# Patient Record
Sex: FEMALE | Race: WHITE | NOT HISPANIC OR LATINO | Employment: FULL TIME | URBAN - METROPOLITAN AREA
[De-identification: names, ages, dates, MRNs, and addresses within clinical notes are randomized per-mention and may not be internally consistent; named-entity substitution may affect disease eponyms.]

---

## 2017-05-05 ENCOUNTER — HOSPITAL ENCOUNTER (EMERGENCY)
Facility: HOSPITAL | Age: 42
Discharge: HOME | End: 2017-05-05
Attending: EMERGENCY MEDICINE | Admitting: EMERGENCY MEDICINE

## 2017-05-05 VITALS
HEART RATE: 89 BPM | DIASTOLIC BLOOD PRESSURE: 96 MMHG | SYSTOLIC BLOOD PRESSURE: 155 MMHG | RESPIRATION RATE: 16 BRPM | BODY MASS INDEX: 34.55 KG/M2 | HEIGHT: 60 IN | WEIGHT: 176 LBS | TEMPERATURE: 97.9 F | OXYGEN SATURATION: 99 %

## 2017-05-05 DIAGNOSIS — E10.65 HYPERGLYCEMIA DUE TO TYPE 1 DIABETES MELLITUS (HCC): ICD-10-CM

## 2017-05-05 DIAGNOSIS — E10.65 POORLY CONTROLLED TYPE 1 DIABETES MELLITUS (HCC): Primary | ICD-10-CM

## 2017-05-05 LAB
ALBUMIN SERPL-MCNC: 3.9 G/DL (ref 3.5–5.2)
ALBUMIN/GLOB SERPL: 1.2 G/DL
ALP SERPL-CCNC: 65 U/L (ref 40–129)
ALT SERPL W P-5'-P-CCNC: 38 U/L (ref 5–33)
ANION GAP SERPL CALCULATED.3IONS-SCNC: 15.8 MMOL/L
AST SERPL-CCNC: 25 U/L (ref 5–32)
BASOPHILS # BLD AUTO: 0.05 10*3/MM3 (ref 0–0.2)
BASOPHILS NFR BLD AUTO: 0.4 % (ref 0–2)
BILIRUB SERPL-MCNC: <0.2 MG/DL (ref 0.2–1.2)
BILIRUB UR QL STRIP: NEGATIVE
BUN BLD-MCNC: 14 MG/DL (ref 6–20)
BUN/CREAT SERPL: 30.4 (ref 7–25)
CALCIUM SPEC-SCNC: 8.9 MG/DL (ref 8.6–10.5)
CHLORIDE SERPL-SCNC: 99 MMOL/L (ref 98–107)
CLARITY UR: CLEAR
CO2 SERPL-SCNC: 22.2 MMOL/L (ref 22–29)
COLOR UR: YELLOW
CREAT BLD-MCNC: 0.46 MG/DL (ref 0.57–1)
DEPRECATED RDW RBC AUTO: 38.2 FL (ref 37–54)
EOSINOPHIL # BLD AUTO: 0.36 10*3/MM3 (ref 0.1–0.3)
EOSINOPHIL NFR BLD AUTO: 3.2 % (ref 0–4)
ERYTHROCYTE [DISTWIDTH] IN BLOOD BY AUTOMATED COUNT: 13.2 % (ref 11.5–14.5)
GFR SERPL CREATININE-BSD FRML MDRD: 148 ML/MIN/1.73
GLOBULIN UR ELPH-MCNC: 3.3 GM/DL
GLUCOSE BLD-MCNC: 272 MG/DL (ref 65–99)
GLUCOSE BLDC GLUCOMTR-MCNC: 300 MG/DL (ref 70–130)
GLUCOSE UR STRIP-MCNC: ABNORMAL MG/DL
HCG SERPL QL: NEGATIVE
HCT VFR BLD AUTO: 39 % (ref 37–47)
HGB BLD-MCNC: 12.9 G/DL (ref 12–16)
HGB UR QL STRIP.AUTO: NEGATIVE
IMM GRANULOCYTES # BLD: 0.04 10*3/MM3 (ref 0–0.03)
IMM GRANULOCYTES NFR BLD: 0.4 % (ref 0–0.5)
KETONES UR QL STRIP: ABNORMAL
LEUKOCYTE ESTERASE UR QL STRIP.AUTO: NEGATIVE
LYMPHOCYTES # BLD AUTO: 2.99 10*3/MM3 (ref 0.6–4.8)
LYMPHOCYTES NFR BLD AUTO: 26.4 % (ref 20–45)
MCH RBC QN AUTO: 26.8 PG (ref 27–31)
MCHC RBC AUTO-ENTMCNC: 33.1 G/DL (ref 31–37)
MCV RBC AUTO: 80.9 FL (ref 81–99)
MONOCYTES # BLD AUTO: 0.5 10*3/MM3 (ref 0–1)
MONOCYTES NFR BLD AUTO: 4.4 % (ref 3–8)
NEUTROPHILS # BLD AUTO: 7.4 10*3/MM3 (ref 1.5–8.3)
NEUTROPHILS NFR BLD AUTO: 65.2 % (ref 45–70)
NITRITE UR QL STRIP: NEGATIVE
NRBC BLD MANUAL-RTO: 0 /100 WBC (ref 0–0)
PH UR STRIP.AUTO: 5.5 [PH] (ref 4.5–8)
PLATELET # BLD AUTO: 289 10*3/MM3 (ref 140–500)
PMV BLD AUTO: 9.8 FL (ref 7.4–10.4)
POTASSIUM BLD-SCNC: 4.2 MMOL/L (ref 3.5–5.2)
PROT SERPL-MCNC: 7.2 G/DL (ref 6–8.5)
PROT UR QL STRIP: NEGATIVE
RBC # BLD AUTO: 4.82 10*6/MM3 (ref 4.2–5.4)
SODIUM BLD-SCNC: 137 MMOL/L (ref 136–145)
SP GR UR STRIP: 1.02 (ref 1–1.03)
UROBILINOGEN UR QL STRIP: ABNORMAL
WBC NRBC COR # BLD: 11.34 10*3/MM3 (ref 4.8–10.8)

## 2017-05-05 PROCEDURE — 82962 GLUCOSE BLOOD TEST: CPT

## 2017-05-05 PROCEDURE — 99284 EMERGENCY DEPT VISIT MOD MDM: CPT | Performed by: EMERGENCY MEDICINE

## 2017-05-05 PROCEDURE — 85025 COMPLETE CBC W/AUTO DIFF WBC: CPT | Performed by: EMERGENCY MEDICINE

## 2017-05-05 PROCEDURE — 81003 URINALYSIS AUTO W/O SCOPE: CPT | Performed by: EMERGENCY MEDICINE

## 2017-05-05 PROCEDURE — 99283 EMERGENCY DEPT VISIT LOW MDM: CPT

## 2017-05-05 PROCEDURE — 84703 CHORIONIC GONADOTROPIN ASSAY: CPT | Performed by: EMERGENCY MEDICINE

## 2017-05-05 PROCEDURE — 80053 COMPREHEN METABOLIC PANEL: CPT | Performed by: EMERGENCY MEDICINE

## 2017-05-05 PROCEDURE — 96360 HYDRATION IV INFUSION INIT: CPT

## 2017-05-05 RX ORDER — LISINOPRIL 10 MG/1
10 TABLET ORAL DAILY
COMMUNITY
End: 2018-03-20 | Stop reason: CLARIF

## 2017-05-05 RX ORDER — PANTOPRAZOLE SODIUM 40 MG/1
40 TABLET, DELAYED RELEASE ORAL DAILY
COMMUNITY

## 2017-05-05 RX ORDER — MELOXICAM 15 MG/1
15 TABLET ORAL DAILY
COMMUNITY
End: 2020-03-03

## 2017-05-05 RX ORDER — SODIUM CHLORIDE 0.9 % (FLUSH) 0.9 %
10 SYRINGE (ML) INJECTION AS NEEDED
Status: DISCONTINUED | OUTPATIENT
Start: 2017-05-05 | End: 2017-05-05 | Stop reason: HOSPADM

## 2017-05-05 RX ADMIN — SODIUM CHLORIDE 500 ML: 9 INJECTION, SOLUTION INTRAVENOUS at 03:52

## 2017-10-19 ENCOUNTER — HOSPITAL ENCOUNTER (OUTPATIENT)
Facility: HOSPITAL | Age: 42
Setting detail: OBSERVATION
Discharge: HOME | End: 2017-10-20
Attending: INTERNAL MEDICINE | Admitting: INTERNAL MEDICINE

## 2017-10-19 ENCOUNTER — APPOINTMENT (OUTPATIENT)
Dept: GENERAL RADIOLOGY | Facility: HOSPITAL | Age: 42
End: 2017-10-19

## 2017-10-19 DIAGNOSIS — I10 HYPERTENSION, UNSPECIFIED TYPE: ICD-10-CM

## 2017-10-19 DIAGNOSIS — R07.89 OTHER CHEST PAIN: Primary | ICD-10-CM

## 2017-10-19 LAB
ALBUMIN SERPL-MCNC: 3.9 G/DL (ref 3.5–5.2)
ALBUMIN/GLOB SERPL: 1.3 G/DL
ALP SERPL-CCNC: 60 U/L (ref 40–129)
ALT SERPL W P-5'-P-CCNC: 37 U/L (ref 5–33)
ANION GAP SERPL CALCULATED.3IONS-SCNC: 13.8 MMOL/L
APTT PPP: 28.7 SECONDS (ref 24.3–38.1)
AST SERPL-CCNC: 27 U/L (ref 5–32)
BASOPHILS # BLD AUTO: 0.04 10*3/MM3 (ref 0–0.2)
BASOPHILS NFR BLD AUTO: 0.4 % (ref 0–2)
BILIRUB SERPL-MCNC: <0.2 MG/DL (ref 0.2–1.2)
BUN BLD-MCNC: 10 MG/DL (ref 6–20)
BUN/CREAT SERPL: 25.6 (ref 7–25)
CALCIUM SPEC-SCNC: 8.6 MG/DL (ref 8.6–10.5)
CHLORIDE SERPL-SCNC: 99 MMOL/L (ref 98–107)
CO2 SERPL-SCNC: 23.2 MMOL/L (ref 22–29)
CREAT BLD-MCNC: 0.39 MG/DL (ref 0.57–1)
D DIMER PPP FEU-MCNC: 0.38 MCGFEU/ML (ref 0–0.46)
DEPRECATED RDW RBC AUTO: 39.5 FL (ref 37–54)
EOSINOPHIL # BLD AUTO: 0.21 10*3/MM3 (ref 0.1–0.3)
EOSINOPHIL NFR BLD AUTO: 2.2 % (ref 0–4)
ERYTHROCYTE [DISTWIDTH] IN BLOOD BY AUTOMATED COUNT: 13.8 % (ref 11.5–14.5)
GFR SERPL CREATININE-BSD FRML MDRD: >150 ML/MIN/1.73
GLOBULIN UR ELPH-MCNC: 2.9 GM/DL
GLUCOSE BLD-MCNC: 234 MG/DL (ref 65–99)
GLUCOSE BLDC GLUCOMTR-MCNC: 197 MG/DL (ref 70–130)
HCG SERPL QL: NEGATIVE
HCT VFR BLD AUTO: 33.4 % (ref 37–47)
HGB BLD-MCNC: 11.2 G/DL (ref 12–16)
IMM GRANULOCYTES # BLD: 0.06 10*3/MM3 (ref 0–0.03)
IMM GRANULOCYTES NFR BLD: 0.6 % (ref 0–0.5)
INR PPP: 0.97 (ref 0.9–1.1)
LYMPHOCYTES # BLD AUTO: 2.49 10*3/MM3 (ref 0.6–4.8)
LYMPHOCYTES NFR BLD AUTO: 25.9 % (ref 20–45)
MCH RBC QN AUTO: 26.7 PG (ref 27–31)
MCHC RBC AUTO-ENTMCNC: 33.5 G/DL (ref 31–37)
MCV RBC AUTO: 79.5 FL (ref 81–99)
MONOCYTES # BLD AUTO: 0.48 10*3/MM3 (ref 0–1)
MONOCYTES NFR BLD AUTO: 5 % (ref 3–8)
NEUTROPHILS # BLD AUTO: 6.35 10*3/MM3 (ref 1.5–8.3)
NEUTROPHILS NFR BLD AUTO: 65.9 % (ref 45–70)
NRBC BLD MANUAL-RTO: 0 /100 WBC (ref 0–0)
PLATELET # BLD AUTO: 264 10*3/MM3 (ref 140–500)
PMV BLD AUTO: 9.8 FL (ref 7.4–10.4)
POTASSIUM BLD-SCNC: 3.5 MMOL/L (ref 3.5–5.2)
PROT SERPL-MCNC: 6.8 G/DL (ref 6–8.5)
PROTHROMBIN TIME: 12.9 SECONDS (ref 12.1–15)
RBC # BLD AUTO: 4.2 10*6/MM3 (ref 4.2–5.4)
SODIUM BLD-SCNC: 136 MMOL/L (ref 136–145)
TROPONIN T SERPL-MCNC: <0.01 NG/ML (ref 0–0.03)
WBC NRBC COR # BLD: 9.63 10*3/MM3 (ref 4.8–10.8)

## 2017-10-19 PROCEDURE — G0378 HOSPITAL OBSERVATION PER HR: HCPCS

## 2017-10-19 PROCEDURE — 84484 ASSAY OF TROPONIN QUANT: CPT | Performed by: PHYSICIAN ASSISTANT

## 2017-10-19 PROCEDURE — 25010000002 ENOXAPARIN PER 10 MG: Performed by: INTERNAL MEDICINE

## 2017-10-19 PROCEDURE — 93005 ELECTROCARDIOGRAM TRACING: CPT | Performed by: PHYSICIAN ASSISTANT

## 2017-10-19 PROCEDURE — 85025 COMPLETE CBC W/AUTO DIFF WBC: CPT | Performed by: PHYSICIAN ASSISTANT

## 2017-10-19 PROCEDURE — 82962 GLUCOSE BLOOD TEST: CPT

## 2017-10-19 PROCEDURE — 99219 PR INITIAL OBSERVATION CARE/DAY 50 MINUTES: CPT | Performed by: INTERNAL MEDICINE

## 2017-10-19 PROCEDURE — 85379 FIBRIN DEGRADATION QUANT: CPT | Performed by: PHYSICIAN ASSISTANT

## 2017-10-19 PROCEDURE — 85610 PROTHROMBIN TIME: CPT | Performed by: PHYSICIAN ASSISTANT

## 2017-10-19 PROCEDURE — 63710000001 INSULIN DETEMIR PER 5 UNITS: Performed by: INTERNAL MEDICINE

## 2017-10-19 PROCEDURE — 96372 THER/PROPH/DIAG INJ SC/IM: CPT

## 2017-10-19 PROCEDURE — 80053 COMPREHEN METABOLIC PANEL: CPT | Performed by: PHYSICIAN ASSISTANT

## 2017-10-19 PROCEDURE — 71010 HC CHEST PA OR AP: CPT

## 2017-10-19 PROCEDURE — 94799 UNLISTED PULMONARY SVC/PX: CPT

## 2017-10-19 PROCEDURE — 99285 EMERGENCY DEPT VISIT HI MDM: CPT | Performed by: PHYSICIAN ASSISTANT

## 2017-10-19 PROCEDURE — 85730 THROMBOPLASTIN TIME PARTIAL: CPT | Performed by: PHYSICIAN ASSISTANT

## 2017-10-19 PROCEDURE — 84484 ASSAY OF TROPONIN QUANT: CPT | Performed by: INTERNAL MEDICINE

## 2017-10-19 PROCEDURE — 84703 CHORIONIC GONADOTROPIN ASSAY: CPT | Performed by: PHYSICIAN ASSISTANT

## 2017-10-19 PROCEDURE — 99285 EMERGENCY DEPT VISIT HI MDM: CPT

## 2017-10-19 PROCEDURE — 93010 ELECTROCARDIOGRAM REPORT: CPT | Performed by: INTERNAL MEDICINE

## 2017-10-19 RX ORDER — SODIUM CHLORIDE 9 MG/ML
40 INJECTION, SOLUTION INTRAVENOUS AS NEEDED
Status: DISCONTINUED | OUTPATIENT
Start: 2017-10-19 | End: 2017-10-20 | Stop reason: HOSPADM

## 2017-10-19 RX ORDER — GLIPIZIDE 5 MG/1
5 TABLET ORAL
Status: DISCONTINUED | OUTPATIENT
Start: 2017-10-19 | End: 2017-10-20 | Stop reason: HOSPADM

## 2017-10-19 RX ORDER — SODIUM CHLORIDE 0.9 % (FLUSH) 0.9 %
10 SYRINGE (ML) INJECTION AS NEEDED
Status: DISCONTINUED | OUTPATIENT
Start: 2017-10-19 | End: 2017-10-20 | Stop reason: HOSPADM

## 2017-10-19 RX ORDER — PANTOPRAZOLE SODIUM 40 MG/1
40 TABLET, DELAYED RELEASE ORAL DAILY
Status: DISCONTINUED | OUTPATIENT
Start: 2017-10-19 | End: 2017-10-20 | Stop reason: HOSPADM

## 2017-10-19 RX ORDER — LISINOPRIL 5 MG/1
TABLET ORAL
Status: COMPLETED
Start: 2017-10-19 | End: 2017-10-19

## 2017-10-19 RX ORDER — NITROGLYCERIN 0.4 MG/1
0.4 TABLET SUBLINGUAL
Status: DISCONTINUED | OUTPATIENT
Start: 2017-10-19 | End: 2017-10-20 | Stop reason: HOSPADM

## 2017-10-19 RX ORDER — ALUMINA, MAGNESIA, AND SIMETHICONE 2400; 2400; 240 MG/30ML; MG/30ML; MG/30ML
15 SUSPENSION ORAL ONCE
Status: COMPLETED | OUTPATIENT
Start: 2017-10-19 | End: 2017-10-19

## 2017-10-19 RX ORDER — ASPIRIN 81 MG/1
324 TABLET, CHEWABLE ORAL ONCE
Status: COMPLETED | OUTPATIENT
Start: 2017-10-19 | End: 2017-10-19

## 2017-10-19 RX ORDER — GLIPIZIDE 5 MG/1
10 TABLET ORAL
COMMUNITY
End: 2020-03-03

## 2017-10-19 RX ORDER — MELOXICAM 7.5 MG/1
15 TABLET ORAL DAILY
Status: DISCONTINUED | OUTPATIENT
Start: 2017-10-20 | End: 2017-10-20 | Stop reason: HOSPADM

## 2017-10-19 RX ORDER — ACETAMINOPHEN 325 MG/1
650 TABLET ORAL EVERY 6 HOURS PRN
Status: DISCONTINUED | OUTPATIENT
Start: 2017-10-19 | End: 2017-10-20 | Stop reason: HOSPADM

## 2017-10-19 RX ORDER — SODIUM CHLORIDE 0.9 % (FLUSH) 0.9 %
1-10 SYRINGE (ML) INJECTION AS NEEDED
Status: DISCONTINUED | OUTPATIENT
Start: 2017-10-19 | End: 2017-10-20 | Stop reason: HOSPADM

## 2017-10-19 RX ORDER — LISINOPRIL 10 MG/1
10 TABLET ORAL DAILY
Status: DISCONTINUED | OUTPATIENT
Start: 2017-10-20 | End: 2017-10-20

## 2017-10-19 RX ADMIN — ENOXAPARIN SODIUM 40 MG: 40 INJECTION SUBCUTANEOUS at 18:48

## 2017-10-19 RX ADMIN — LISINOPRIL 10 MG: 5 TABLET ORAL at 21:51

## 2017-10-19 RX ADMIN — LIDOCAINE HYDROCHLORIDE 15 ML: 20 SOLUTION ORAL; TOPICAL at 15:53

## 2017-10-19 RX ADMIN — METFORMIN HYDROCHLORIDE 1000 MG: 500 TABLET ORAL at 18:49

## 2017-10-19 RX ADMIN — ALUMINUM HYDROXIDE, MAGNESIUM HYDROXIDE, AND DIMETHICONE 15 ML: 400; 400; 40 SUSPENSION ORAL at 15:53

## 2017-10-19 RX ADMIN — LISINOPRIL 10 MG: 10 TABLET ORAL at 21:51

## 2017-10-19 RX ADMIN — PANTOPRAZOLE SODIUM 40 MG: 40 TABLET, DELAYED RELEASE ORAL at 18:49

## 2017-10-19 RX ADMIN — NITROGLYCERIN 0.4 MG: 0.4 TABLET SUBLINGUAL at 15:54

## 2017-10-19 RX ADMIN — INSULIN DETEMIR 60 UNITS: 100 INJECTION, SOLUTION SUBCUTANEOUS at 21:53

## 2017-10-19 RX ADMIN — GLIPIZIDE 5 MG: 5 TABLET ORAL at 18:49

## 2017-10-19 RX ADMIN — NITROGLYCERIN 0.4 MG: 0.4 TABLET SUBLINGUAL at 16:02

## 2017-10-19 RX ADMIN — ASPIRIN 81 MG 324 MG: 81 TABLET ORAL at 15:54

## 2017-10-19 NOTE — H&P
"       HOSPITALIST SERVICES     @ [facility], [place]                [facility] Hospitalist Team    ADMISSION HISTORY AND PHYSICAL    PATIENT:      Patient Care Team:  ARTEMIO Black as PCP - General (Family Medicine)    PATIENT IS A RESIDENT OF:  Lives at home      PATIENT ACCOMPANIED BY:        CHIEF COMPLAINT:     Elevated BP and chest pain for 2 days    HISTORY OF PRESENT ILLNESS:    Ms. Jenni Lopez is a 42 year old  female who is known to have hx of Type II DM, GERD and a former smoker presented to Urgebnt Care with hx of elevated BP and chest pain for 2 days. The chest pain started at rest and is associated with some shortness of breath, lightheadedness. The pain is like \"a bear hugging her.\" Patient presented to Urgent Care and she was advised to come to [facility]. The chest pain was relieved with 2 S/L NTG. Nothing else made pain better or worse. The pain radiated to both arms. No hx of syncope, diaphoresis or palpitations. She does take lisinopril 10 mg for renal protection with diabetes, but has not had any previous hypertensive history. Patient denies any sx of fever, headache, abdominal pain, nausea/ vomiting, dysuria, urgency/ frequency or any recent hx of blood in stool. No other medical history available.      Thorough analysis of the patient’s complaint using the FAR COLD system:   Frequency started 2 days ago  Associated symptoms shortness of breath and lightheadedness  Radiations to both arms   Character \"like a bear hugging her\"   Onset 2 days ago   Location precordial area  Duration constant for 2 days  Exacerbating factors NONE  Relieving factors 2 S/L NTG      HEART Score  History: Moderately suspicious (+1)  ECG: Normal (+0)  Age: Greater than or equal to 65 (+0)  Risk Factors: 3 or more risk factors OR history of atherosclerotic disease (+2)  Troponin: Normal limit or lower (+0)  Total: 3        RISK FACTORS OF HEART DISEASE:  Risk " "factors are conditions or habits that make a person more likely to develop a disease. They can also increase the chances that an existing disease will get worse. Important risk factors for heart disease that you can do something about are:  High blood pressure   High blood cholesterol   Diabetes and prediabetes   Smoking (quit in Jan 2017)  Excessive Alcohol consumption  Illegal drug use.  Being overweight or obese   Being physically inactive   Having a family history of early heart disease   Having a history of preeclampsia during pregnancy   Unhealthy diet   Age (55 or older for women)   Race and Ethinicity: Heart disease has the leading cause of death in the [place] for non- whites, non- blacks, and American Indians. For Hispanics, and  Americans and Pacific Islanders, heart disease is second only to cancer as a cause of death.          Past Medical History:   Diagnosis Date   • Diabetes mellitus      History reviewed. No pertinent surgical history.  History reviewed. No pertinent family history.    Family History as documented in the Problem List.    Social History   Substance Use Topics   • Smoking status: Former Smoker     Types: Cigarettes     Quit date: 1/19/2017   • Smokeless tobacco: None   • Alcohol use None       (Not in a hospital admission)  Allergies:  Arithmin [antazoline]; Erythromycin; and Vicodin [hydrocodone-acetaminophen]    REVIEW OF SYSTEMS:  Please see the above history of present illness for pertinent positives and negatives.  The remainder of the patient's systems have been reviewed and are negative.     ROS negative except current and past medical and surgical history as noted in the Problem List.      Vital Signs  Temp:  [99.2 °F (37.3 °C)] 99.2 °F (37.3 °C)  Heart Rate:  [] 93  Resp:  [13-18] 13  BP: (120-188)/() 137/83    Flowsheet Rows         First Filed Value    Admission Height  61\" (154.9 cm) Documented at 10/19/2017 1320    Admission Weight  " 180 lb (81.6 kg) Documented at 10/19/2017 1320           Physical Exam:    PHYSICAL EXAMINATION:    VITAL SIGNS: As per Nurse's notes    GENERAL APPEARANCE: The patient is a well developed, well nourished, , in no acute distress without complaints of shortness of breath, dyspnea or acute pain. Lips and nail beds are pink.    HEENT: Normocephalic, atraumatic. PERRL. The sclerae anicteric and conjunctivae pink and moist. Lips, teeth, and gums showed normal mucosa. The oral mucosa, hard and soft palate, tongue and posterior pharynx were normal.     NECK: Supple and symmetric. No masses. No thyromegaly. No tenderness. Trachea central. No carotid bruits. No evidence of JVD.    LYMPH NODES: No palpable lymphadenopathy.    SKIN: Warm, dry and intact. No rash or lesions or wounds or patechiae.    CHEST: Normal AP diameter and normal contour without any kyphoscoliosis.    LUNGS: Clear to auscultation bilaterally. Respiratory expansion equal bilaterally. No wheezes/rales/crackles/rubs.    CARDIOVASCULAR: RRR. S1, S2 normal without murmur/gallop/rub. No S3, S4. The carotid pulses were normal and 2+ bilaterally without bruits. Peripheral pulses were 2+ and symmetric. Capillary refill less than 3 seconds.    ABDOMEN: Soft, non-tender, non-distended. No masses. No rebound/guarding. No hepatosplenomegaly. Normal bowel sounds.     RECTAL: Not indicated.     EXTREMITIES:  No evidence of cyanosis, clubbing, or edema. No rash or lesions. + pedal pulses. Moves all extremities well. Negative Homans sign bilaterally.     MUSCULOSKELETAL: Examination of cervical and lumbosacral spines unremarkable. ROM of extremities WNL. No evidence of redness, swelling, warmth or pain of the joints of the extremities. Muscle strength and tone normal.    PSYCHIATRY: Responds appropriately to questions. No evidence of acute anxiety, depression, panic attacks or hallucinations.    MENTAL STATUS EXAMINATION: Neatly dressed, conscious and alert.  Speech normal in tone. Pleasant and cooperative. Orientation x3. Thought process, content and insight are normal. Memory without deficits.    NEUROLOGIC: Examination is grossly intact globally with no focal deficits. Cranial nerves II through XII are grossly intact. No motor weakness. No decrease in sensation. No facial asymmetry.      Results Review:    I reviewed the patient's new clinical results.  Lab Results (most recent)     Procedure Component Value Units Date/Time    CBC & Differential [[id number]] Collected:  10/19/17 1430    Specimen:  Blood Updated:  10/19/17 1444    Narrative:       The following orders were created for panel order CBC & Differential.  Procedure                               Abnormality         Status                     ---------                               -----------         ------                     CBC Auto Differential[[id number]]        Abnormal            Final result                 Please view results for these tests on the individual orders.    CBC Auto Differential [[id number]]  (Abnormal) Collected:  10/19/17 1430    Specimen:  Blood Updated:  10/19/17 1444     WBC 9.63 10*3/mm3      RBC 4.20 10*6/mm3      Hemoglobin 11.2 (L) g/dL      Hematocrit 33.4 (L) %      MCV 79.5 (L) fL      MCH 26.7 (L) pg      MCHC 33.5 g/dL      RDW 13.8 %      RDW-SD 39.5 fl      MPV 9.8 fL      Platelets 264 10*3/mm3      Neutrophil % 65.9 %      Lymphocyte % 25.9 %      Monocyte % 5.0 %      Eosinophil % 2.2 %      Basophil % 0.4 %      Immature Grans % 0.6 (H) %      Neutrophils, Absolute 6.35 10*3/mm3      Lymphocytes, Absolute 2.49 10*3/mm3      Monocytes, Absolute 0.48 10*3/mm3      Eosinophils, Absolute 0.21 10*3/mm3      Basophils, Absolute 0.04 10*3/mm3      Immature Grans, Absolute 0.06 (H) 10*3/mm3      nRBC 0.0 /100 WBC     hCG, Serum, Qualitative [[id number]]  (Normal) Collected:  10/19/17 1430    Specimen:  Blood Updated:  10/19/17 1502     HCG Qualitative Negative    Troponin  [[id number]]  (Normal) Collected:  10/19/17 1430    Specimen:  Blood Updated:  10/19/17 1506     Troponin T <0.010 ng/mL     Narrative:       Troponin T Reference Ranges:  Less than 0.03 ng/mL:    Negative for AMI  0.03 to 0.09 ng/mL:      Indeterminant for AMI  Greater than 0.09 ng/mL: Positive for AMI    Comprehensive Metabolic Panel [[id number]]  (Abnormal) Collected:  10/19/17 1430    Specimen:  Blood Updated:  10/19/17 1518     Glucose 234 (H) mg/dL      BUN 10 mg/dL      Creatinine 0.39 (L) mg/dL      Sodium 136 mmol/L      Potassium 3.5 mmol/L      Chloride 99 mmol/L      CO2 23.2 mmol/L      Calcium 8.6 mg/dL      Total Protein 6.8 g/dL      Albumin 3.90 g/dL      ALT (SGPT) 37 (H) U/L      AST (SGOT) 27 U/L      Alkaline Phosphatase 60 U/L      Total Bilirubin <0.2 (L) mg/dL      eGFR Non African Amer >150 mL/min/1.73      Globulin 2.9 gm/dL      A/G Ratio 1.3 g/dL      BUN/Creatinine Ratio 25.6 (H)     Anion Gap 13.8 mmol/L     D-dimer, Quantitative [[id number]]  (Normal) Collected:  10/19/17 1430    Specimen:  Blood Updated:  10/19/17 1518     D-Dimer, Quantitative 0.38 MCGFEU/mL     Narrative:       Can be elevated in, but is not diagnostic for deep vein thrombosis (DVT) or pulmonary embolis (PE).  It is also elevated in other medical conditions.  Clinical correlation is required.  The negative cut-off value for the D-Dimer is 0.50 mcg FEU/mL for DVT and PE.    Protime-INR [[id number]]  (Normal) Collected:  10/19/17 1430    Specimen:  Blood Updated:  10/19/17 1518     Protime 12.9 Seconds      INR 0.97    Narrative:       Therapeutic Ranges for INR: 2.0-3.0 (PT 20-30)                              2.5-3.5 (PT 25-34)    aPTT [[id number]]  (Normal) Collected:  10/19/17 1430    Specimen:  Blood Updated:  10/19/17 1520     PTT 28.7 seconds     Narrative:       PTT = The equivalent PTT values for the therapeutic range of heparin levels at 0.1 to 0.7 U/ml are 53 to 110 seconds.    Troponin [[id number]]  (Normal)  Collected:  10/19/17 1601    Specimen:  Blood Updated:  10/19/17 1628     Troponin T <0.010 ng/mL     Narrative:       Troponin T Reference Ranges:  Less than 0.03 ng/mL:    Negative for AMI  0.03 to 0.09 ng/mL:      Indeterminant for AMI  Greater than 0.09 ng/mL: Positive for AMI          Imaging Results (most recent)     Procedure Component Value Units Date/Time    XR Chest 1 View [[id number]] Collected:  10/19/17 1452     Updated:  10/19/17 1455    Narrative:       EXAM: AP portable chest.     DATE: 10/19/2017.     HISTORY: Chest tightness, inability to take a deep breath. Symptoms for  3 days. Hypertension today.     COMPARISON: None     FINDINGS: No acute airspace disease, pleural effusion or pneumothorax.  Normal heart size. No acute osseous abnormality       Impression:       No acute chest findings.     This report was finalized on 10/19/2017 2:53 PM by Dr. Britni Kiser MD.           reviewed    ECG/EMG Results (most recent)     Procedure Component Value Units Date/Time    ECG 12 Lead [[id number]] Collected:  10/19/17 1404     Updated:  10/19/17 1408        reviewed    Assessment/Plan       DIFFERENTIAL DIAGNOSES CONSIDERED FOR CHIEF COMPLAINT:        The following clinical entities were considered in differential diagnosis. The list includes commonly encountered practical probabilities and rare esoteric diagnoses worked out through systemic diagnostic methods used to identify the presence of a disease entity where multiple diagnoses are possible. The decision is reached through either of the following methods: 1) direct confirmatory testing, or 2) a process of elimination, or 3) at least a process of obtaining information that shrinks the “probabilities” of candidate conditions to negligible levels, by using clinical evidence and thus implementing aspects of the hypothetico-deductive method.        DIFFERENTIAL DIAGNOSIS OF CHEST PAIN:  1) Acute Myocardial Infarction, 2) Unstable angina, 3) Chest wall  pain, 4) Gastroesophageal reflux disease (GERD), 5) Panic disorder/ Anxiety state, 6) Pericarditis, 7) Pneumonia, 8) Congestive Heart Failure, 9) Pulmonary embolism, 10) Acute thoracic aortic dissection        ASSESSMENT AND PLAN:       SUMMARY:    ?   PROPHYLAXIS:   -Oxygen Saturation: As per Nurses' notes.   -DVT Prophylaxis: On Inj. Lovenox and SCDs  -Gastritis Prophylaxis: Pantoprazole 40 mg PO qAM   -Constipation Prophylaxis: N/A    -Immunizations: N/A  -Intake and Output Orders: As per order sheet   -[place]jas Catheter: Not indicated at this time  -Smoking/ Nicotine issues: N/A      ACTIVITIES:  -Bathroom Privileges: May use bathroom   -Activity: Encouraged to sit up in side chair for 2-4 hours BID   -PT/OT: N/A      NUTRITION AND FLUIDS:  -Diet/ Nutrition: Regular, cardiac, consistent carbohydrate diet. NPO after midnight    -Fluid Status/Electrolytes: Saline Lock       SOCIAL ISSUES:   -MRSA SCREEN: As per institutional protocol   -Behavioral/ Agitation Issues: NONE   -Social Issues: Lives at home with her family.   -Occupational Issues: Patient works as a manager at [employer]    -Code Status: FULL CODE on admission   -Disposition: Should be able to go home once ready for discharge     THERAPEUTIC:   ALLERGIES: as per admission H&P   ANTIBIOTICS: N/A  PAIN MANAGEMENT: NTG    ANTIPYRETIC: Tylenol 650 mg 1 po q4-6 hours for headache or   temp >100 degrees   INSULIN THERAPY: Low dose insulin coverage as ordered   CHEST PAIN: NTG 0.4 MG s/l at onset of chest pain; Repeat q5 min x 2 PRN   NEBULIZER TREATMENT: N/A    ANXIETY: N/A    DEPRESSION: N/A    INSOMNIA: N/A               PLAN:    Labs and diagnostic tests reviewed: Trop <0.010, Gluc 234, Na 136, K 3.5, Creat 0.39, ALT/AST 37/27, HCG Qual Neg, D-dimer 0.38, PT/INR 12.9/0.97, aPTT 28.7, WBC 9.6, Hb 11.2, Plt 264    Diagnostic tests reviewed:      CXR  IMPRESSION:  No acute chest findings.      This report was finalized on 10/19/2017 2:53 PM by Dr. Ryder  MD Margi.      EKG  Normal sinus rhythm. No acute changes          Patient is clinically and hemodynamically stable    Will be seen by cardiology consultant in the morning    Any new recommendations: As per cardiology    New Labs ordered: Serial Troponin, CBC, CMP and Lipids in AM    New diagnostic tests ordered: Echocardiogram in AM    Any changes in medications: N/A    To continue current management and supportive care    Pain management issues:NTG s/q    Discharge planning issues: Patient should be able to go home once ready for discharge    Will follow patient closely    Nothing new to add for right now          DIAGNOSES:      PRIMARY DIAGNOSES:      Chest Pain: Pain got relieved after 2 SL NTG. Patient does not have hx of cheat pain, CAD or MI in the past. Will order serial Troponin, Echo in AM and cardiology consult is requested. Hopefully patient will have cardiac stress test in AM    Associated shortness of breath: No hx of COPD or asthma. 96% Oxygen saturation at this time. D-dimer WNL    HTN: On Lisinopril. Last /83 (came down from 188/105)    HLD: Will check lipd profile in AM    Type II DM: On Insulin, Januvia, Metformin and Glipizide    GERD: On Pantoprazole    Former smoker: Quit in January 2017    Morbid Obesity: BMI almost 35. Nutritional consult    DVT Prophylaxis: On Inj Lovenox and SCDs  ?     SECONDARY DIAGNOSES:  ?     As above        SURGICAL DIAGNOSES:        No hx of any surgeries          I discussed the patients findings and my recommendations with patient and patient is agreeable to current treatment and management plan.     Oziel Buchanan MD  10/19/17  5:26 PM          Oziel Buchanan M.D., FACP  Internal Medicine/ Hospitalist        Time:

## 2017-10-19 NOTE — ED NOTES
Chief Complaint   Patient presents with   • Hypertension     sent by urgent care     The patient presents to room 2 ambulatory with a steady gait from triage complaining of high blood pressure for two days.  She went to the urgent care center and was referred here for evaluation.  She is having pain to her upper shoulders and arms and pain to her chest.  States it feels like a vice around her chest wall.  She is diabetic and takes a low dose of lisinopril to keep her blood pressure down.   Placed the patient on the monitor.       Kaylan Luz RN  10/19/17 5252

## 2017-10-19 NOTE — ED PROVIDER NOTES
Subjective   History of Present Illness  History of Present Illness    Chief complaint: High blood pressure    Location: Generalized    Quality/Severity:  Chest tightness, moderate    Timing/Duration: 2 days, constant    Modifying Factors: Nothing specific makes worse or better    Associated Symptoms: Positive lightheadedness.  Positive chest tightness.  Positive shortness of breath like she cannot catch her breath.  Denies cough.  Denies fevers or chills.  Denies nausea or vomiting.  Denies back pain or abdominal pain    Narrative: 42-year-old female presents with high blood pressure for 2 days.  She states that she is also had some associated chest tightness and arm tightness, as well as shortness of breath.  She has had a little bit of lightheadedness.  She denies the room spinning.  She went to urgent care center today and was told to come here.  She does take lisinopril 10 mg for renal protection with diabetes, but has not had any previous hypertensive history.    Review of Systems  General: Denies fevers or chills.  Denies any weakness or fatigue.  Denies any weight loss or weight gain.  SKIN: Denies any rashes lesions or ulcers.  Denies color change.  ENT: Denies sore throat or rhinorrhea.  Denies ear pain.    EYES: Denies any blurred vision.  Denies any change in vision.  Denies any photophobia.  Denies any vision loss.  LUNGS: + shortness of breath. denies wheezing.  Denies any cough.  Denies any hemoptysis.  CARDIAC: +chest pain.  Denies palpitations.  Denies syncope.  Denies any edema  ABD: Denies any abdominal pain.  Denies any nausea or vomiting or diarrhea.  Denies any rectal bleeding.  Denies constipation  : Denies any dysuria, urgency, frequency or hematuria.  Denies discharge.  Denies flank pain.  NEURO: Denies any focal weakness.  Denies headache.  Denies seizures.  Denies changes in speech or difficulty walking.  ENDOCRINE: Denies polydipsia and polyuria  M/S: Denies arthralgias, back pain,  myalgias or neck pain  HEME/LYMPH: Negative for adenopathy. Does not bruise/bleed easily.   PSYCH: Negative for suicidal ideas. Denies anxiety or depression  review was performed in addition to those in the above all other reviews are negative.      Past Medical History:   Diagnosis Date   • Diabetes mellitus        Allergies   Allergen Reactions   • Arithmin [Antazoline]    • Erythromycin    • Vicodin [Hydrocodone-Acetaminophen]        History reviewed. No pertinent surgical history.    History reviewed. No pertinent family history.    Social History     Social History   • Marital status:      Spouse name: N/A   • Number of children: N/A   • Years of education: N/A     Social History Main Topics   • Smoking status: Former Smoker     Types: Cigarettes     Quit date: 1/19/2017   • Smokeless tobacco: None   • Alcohol use None   • Drug use: None   • Sexual activity: Not Asked     Other Topics Concern   • None     Social History Narrative   • None     No current facility-administered medications on file prior to encounter.      Current Outpatient Prescriptions on File Prior to Encounter   Medication Sig Dispense Refill   • Insulin Glargine (LANTUS SOLOSTAR SC) Inject 70 Units under the skin.     • lisinopril (PRINIVIL,ZESTRIL) 10 MG tablet Take 10 mg by mouth Daily.     • meloxicam (MOBIC) 15 MG tablet Take 15 mg by mouth Daily.     • metFORMIN (GLUCOPHAGE) 1000 MG tablet Take 1,000 mg by mouth 2 (Two) Times a Day With Meals.     • pantoprazole (PROTONIX) 40 MG EC tablet Take 40 mg by mouth Daily.     • SITagliptin (JANUVIA) 100 MG tablet Take 100 mg by mouth Daily.     • GLIPIZIDE PO Take 25 mg by mouth 2 (Two) Times a Day.               Objective   Physical Exam  Vitals:    10/19/17 1430   BP: 155/84   Pulse: 94   Resp: 13   Temp:    SpO2: 96%   temp 99.2    GENERAL: a/o x 4, NAD  SKIN: Warm pink and dry   HEENT:  PERRLA, EOM intact, conjunctiva normal, sclera clear  NECK: supple, no JVD  LUNGS: Clear to  auscultation bilaterally without wheezes, rales or rhonchi.  No accessory muscle use and no nasal flaring.  CARDIAC:  Regular rate and rhythm, S1-S2.  No murmurs, rubs or gallops.  No peripheral edema.  Equal pulses bilaterally.  ABDOMEN: Soft, nontender, nondistended.  No guarding or rebound tenderness.  Normal bowel sounds.  MUSCULOSKELETAL: Moves all extremities well.  No deformity.  NEURO: Cranial nerves II through XII grossly intact.  No gross focal deficits.  Alert.  Normal speech and motor.  PSYCH: Mildly anxious        Procedures         ED Course  ED Course      EKG         EKG time / Interpretation time: 1404 / 1406  Rhythm/Rate: nsr 95   NM: 180  QRS, axis: 35   QTc 438  ST and T waves:nl   Interpreted Contemporaneously by me, independently viewed by me and MD.  no prior      Reviewed CXR. Independently viewed by me. Interpreted by radiologist. Discussed with pt.  Xr Chest 1 View    Result Date: 10/19/2017  Narrative: EXAM: AP portable chest.  DATE: 10/19/2017.  HISTORY: Chest tightness, inability to take a deep breath. Symptoms for 3 days. Hypertension today.  COMPARISON: None  FINDINGS: No acute airspace disease, pleural effusion or pneumothorax. Normal heart size. No acute osseous abnormality      Impression: No acute chest findings.  This report was finalized on 10/19/2017 2:53 PM by Dr. Britni Kiser MD.      Results for orders placed or performed during the hospital encounter of 10/19/17   Troponin   Result Value Ref Range    Troponin T <0.010 0.000 - 0.030 ng/mL   hCG, Serum, Qualitative   Result Value Ref Range    HCG Qualitative Negative Negative   CBC Auto Differential   Result Value Ref Range    WBC 9.63 4.80 - 10.80 10*3/mm3    RBC 4.20 4.20 - 5.40 10*6/mm3    Hemoglobin 11.2 (L) 12.0 - 16.0 g/dL    Hematocrit 33.4 (L) 37.0 - 47.0 %    MCV 79.5 (L) 81.0 - 99.0 fL    MCH 26.7 (L) 27.0 - 31.0 pg    MCHC 33.5 31.0 - 37.0 g/dL    RDW 13.8 11.5 - 14.5 %    RDW-SD 39.5 37.0 - 54.0 fl    MPV 9.8 7.4  - 10.4 fL    Platelets 264 140 - 500 10*3/mm3    Neutrophil % 65.9 45.0 - 70.0 %    Lymphocyte % 25.9 20.0 - 45.0 %    Monocyte % 5.0 3.0 - 8.0 %    Eosinophil % 2.2 0.0 - 4.0 %    Basophil % 0.4 0.0 - 2.0 %    Immature Grans % 0.6 (H) 0.0 - 0.5 %    Neutrophils, Absolute 6.35 1.50 - 8.30 10*3/mm3    Lymphocytes, Absolute 2.49 0.60 - 4.80 10*3/mm3    Monocytes, Absolute 0.48 0.00 - 1.00 10*3/mm3    Eosinophils, Absolute 0.21 0.10 - 0.30 10*3/mm3    Basophils, Absolute 0.04 0.00 - 0.20 10*3/mm3    Immature Grans, Absolute 0.06 (H) 0.00 - 0.03 10*3/mm3    nRBC 0.0 0.0 - 0.0 /100 WBC     Cp resolved after ntg x 2 (asa also given).  Given patient's symptoms and history of diabetes will keep for 23 observation for cardiac rule out. Pt is agreeable.  CONSULT  Time 1650  Discussed case with Dr Buchanan  Reviewed history, exam, results and treatments.  Discussed concerns and plan of care. Dr Buchanan accepts pt to be admitted to obs.                MDM  Number of Diagnoses or Management Options  Hypertension, unspecified type: new and requires workup  Other chest pain: new and requires workup     Amount and/or Complexity of Data Reviewed  Clinical lab tests: reviewed and ordered  Tests in the radiology section of CPT®: reviewed and ordered  Tests in the medicine section of CPT®: reviewed and ordered  Decide to obtain previous medical records or to obtain history from someone other than the patient: yes  Discuss the patient with other providers: yes  Independent visualization of images, tracings, or specimens: yes    Risk of Complications, Morbidity, and/or Mortality  Presenting problems: high  Diagnostic procedures: moderate  Management options: moderate    Patient Progress  Patient progress: improved    My differential diagnosis for chest pain includes but is not limited to:  Muscle strain, costochondritis, myositis, pleurisy, rib fracture, intercostal neuritis, herpes zoster, tumor, myocardial infarction, coronary syndrome,  unstable angina, angina, aortic dissection, mitral valve prolapse, pericarditis, palpitations, pulmonary embolus, pneumonia, pneumothorax, lung cancer, GERD, esophagitis, esophageal spasm      Final diagnoses:   Other chest pain   Hypertension, unspecified type     Dictated utilizing Dragon dictation         Rose Lagunas PA-C  10/19/17 4312

## 2017-10-20 ENCOUNTER — APPOINTMENT (OUTPATIENT)
Dept: NUCLEAR MEDICINE | Facility: HOSPITAL | Age: 42
End: 2017-10-20

## 2017-10-20 ENCOUNTER — APPOINTMENT (OUTPATIENT)
Dept: CARDIOLOGY | Facility: HOSPITAL | Age: 42
End: 2017-10-20

## 2017-10-20 ENCOUNTER — APPOINTMENT (OUTPATIENT)
Dept: CARDIOLOGY | Facility: HOSPITAL | Age: 42
End: 2017-10-20
Attending: INTERNAL MEDICINE

## 2017-10-20 VITALS
BODY MASS INDEX: 34.36 KG/M2 | OXYGEN SATURATION: 99 % | RESPIRATION RATE: 16 BRPM | TEMPERATURE: 98 F | SYSTOLIC BLOOD PRESSURE: 119 MMHG | HEIGHT: 61 IN | DIASTOLIC BLOOD PRESSURE: 75 MMHG | WEIGHT: 182 LBS | HEART RATE: 83 BPM

## 2017-10-20 LAB
ALBUMIN SERPL-MCNC: 3.5 G/DL (ref 3.5–5.2)
ALBUMIN/GLOB SERPL: 1.1 G/DL
ALP SERPL-CCNC: 62 U/L (ref 40–129)
ALT SERPL W P-5'-P-CCNC: 48 U/L (ref 5–33)
ANION GAP SERPL CALCULATED.3IONS-SCNC: 14 MMOL/L
ASCENDING AORTA: 2.9 CM
AST SERPL-CCNC: 40 U/L (ref 5–32)
BASOPHILS # BLD AUTO: 0.03 10*3/MM3 (ref 0–0.2)
BASOPHILS NFR BLD AUTO: 0.3 % (ref 0–2)
BH CV ECHO MEAS - ACS: 2 CM
BH CV ECHO MEAS - AO MAX PG (FULL): 4.1 MMHG
BH CV ECHO MEAS - AO MAX PG: 8 MMHG
BH CV ECHO MEAS - AO MEAN PG (FULL): 2 MMHG
BH CV ECHO MEAS - AO MEAN PG: 4 MMHG
BH CV ECHO MEAS - AO ROOT AREA (BSA CORRECTED): 1.5
BH CV ECHO MEAS - AO ROOT AREA: 6.2 CM^2
BH CV ECHO MEAS - AO ROOT DIAM: 2.8 CM
BH CV ECHO MEAS - AO V2 MAX: 141 CM/SEC
BH CV ECHO MEAS - AO V2 MEAN: 94.3 CM/SEC
BH CV ECHO MEAS - AO V2 VTI: 25.1 CM
BH CV ECHO MEAS - ASC AORTA: 2.9 CM
BH CV ECHO MEAS - AVA(I,A): 2.4 CM^2
BH CV ECHO MEAS - AVA(I,D): 2.4 CM^2
BH CV ECHO MEAS - AVA(V,A): 2.2 CM^2
BH CV ECHO MEAS - AVA(V,D): 2.2 CM^2
BH CV ECHO MEAS - BSA(HAYCOCK): 1.9 M^2
BH CV ECHO MEAS - BSA: 1.8 M^2
BH CV ECHO MEAS - BZI_BMI: 34.4 KILOGRAMS/M^2
BH CV ECHO MEAS - BZI_METRIC_HEIGHT: 154.9 CM
BH CV ECHO MEAS - BZI_METRIC_WEIGHT: 82.6 KG
BH CV ECHO MEAS - CONTRAST EF (2CH): 62.8 ML/M^2
BH CV ECHO MEAS - CONTRAST EF 4CH: 61.3 ML/M^2
BH CV ECHO MEAS - EDV(CUBED): 114.8 ML
BH CV ECHO MEAS - EDV(MOD-SP2): 70.1 ML
BH CV ECHO MEAS - EDV(MOD-SP4): 80.8 ML
BH CV ECHO MEAS - EDV(TEICH): 110.7 ML
BH CV ECHO MEAS - EF(CUBED): 72.8 %
BH CV ECHO MEAS - EF(MOD-SP2): 62.8 %
BH CV ECHO MEAS - EF(MOD-SP4): 61.3 %
BH CV ECHO MEAS - EF(TEICH): 64.4 %
BH CV ECHO MEAS - ESV(CUBED): 31.3 ML
BH CV ECHO MEAS - ESV(MOD-SP2): 26.1 ML
BH CV ECHO MEAS - ESV(MOD-SP4): 31.3 ML
BH CV ECHO MEAS - ESV(TEICH): 39.4 ML
BH CV ECHO MEAS - FS: 35.2 %
BH CV ECHO MEAS - IVS/LVPW: 0.96
BH CV ECHO MEAS - IVSD: 1.1 CM
BH CV ECHO MEAS - LAT PEAK E' VEL: 11 CM/SEC
BH CV ECHO MEAS - LV DIASTOLIC VOL/BSA (35-75): 44.5 ML/M^2
BH CV ECHO MEAS - LV MASS(C)D: 209.2 GRAMS
BH CV ECHO MEAS - LV MASS(C)DI: 115.3 GRAMS/M^2
BH CV ECHO MEAS - LV MAX PG: 3.9 MMHG
BH CV ECHO MEAS - LV MEAN PG: 2 MMHG
BH CV ECHO MEAS - LV SYSTOLIC VOL/BSA (12-30): 17.2 ML/M^2
BH CV ECHO MEAS - LV V1 MAX: 98.3 CM/SEC
BH CV ECHO MEAS - LV V1 MEAN: 64.3 CM/SEC
BH CV ECHO MEAS - LV V1 VTI: 19.4 CM
BH CV ECHO MEAS - LVIDD: 4.9 CM
BH CV ECHO MEAS - LVIDS: 3.2 CM
BH CV ECHO MEAS - LVLD AP2: 7.5 CM
BH CV ECHO MEAS - LVLD AP4: 8.4 CM
BH CV ECHO MEAS - LVLS AP2: 7.6 CM
BH CV ECHO MEAS - LVLS AP4: 7.5 CM
BH CV ECHO MEAS - LVOT AREA (M): 3.1 CM^2
BH CV ECHO MEAS - LVOT AREA: 3.1 CM^2
BH CV ECHO MEAS - LVOT DIAM: 2 CM
BH CV ECHO MEAS - LVPWD: 1.2 CM
BH CV ECHO MEAS - MED PEAK E' VEL: 6 CM/SEC
BH CV ECHO MEAS - MV A DUR: 0.11 SEC
BH CV ECHO MEAS - MV A MAX VEL: 84.4 CM/SEC
BH CV ECHO MEAS - MV DEC SLOPE: 564 CM/SEC^2
BH CV ECHO MEAS - MV DEC TIME: 0.2 SEC
BH CV ECHO MEAS - MV E MAX VEL: 89.2 CM/SEC
BH CV ECHO MEAS - MV E/A: 1.1
BH CV ECHO MEAS - MV MAX PG: 3.1 MMHG
BH CV ECHO MEAS - MV MEAN PG: 2 MMHG
BH CV ECHO MEAS - MV P1/2T MAX VEL: 92.1 CM/SEC
BH CV ECHO MEAS - MV P1/2T: 47.8 MSEC
BH CV ECHO MEAS - MV V2 MAX: 87.9 CM/SEC
BH CV ECHO MEAS - MV V2 MEAN: 60.3 CM/SEC
BH CV ECHO MEAS - MV V2 VTI: 17.8 CM
BH CV ECHO MEAS - MVA P1/2T LCG: 2.4 CM^2
BH CV ECHO MEAS - MVA(P1/2T): 4.6 CM^2
BH CV ECHO MEAS - MVA(VTI): 3.4 CM^2
BH CV ECHO MEAS - PA ACC TIME: 0.11 SEC
BH CV ECHO MEAS - PA MAX PG (FULL): 1.2 MMHG
BH CV ECHO MEAS - PA MAX PG: 3.5 MMHG
BH CV ECHO MEAS - PA PR(ACCEL): 28.2 MMHG
BH CV ECHO MEAS - PA V2 MAX: 93.7 CM/SEC
BH CV ECHO MEAS - PULM A REVS DUR: 0.11 SEC
BH CV ECHO MEAS - PULM A REVS VEL: 22.4 CM/SEC
BH CV ECHO MEAS - PULM DIAS VEL: 40.2 CM/SEC
BH CV ECHO MEAS - PULM S/D: 1.4
BH CV ECHO MEAS - PULM SYS VEL: 57.5 CM/SEC
BH CV ECHO MEAS - PVA(V,A): 2.6 CM^2
BH CV ECHO MEAS - PVA(V,D): 2.6 CM^2
BH CV ECHO MEAS - QP/QS: 0.64
BH CV ECHO MEAS - RAP SYSTOLE: 3 MMHG
BH CV ECHO MEAS - RV MAX PG: 2.3 MMHG
BH CV ECHO MEAS - RV MEAN PG: 1 MMHG
BH CV ECHO MEAS - RV V1 MAX: 76.1 CM/SEC
BH CV ECHO MEAS - RV V1 MEAN: 45.4 CM/SEC
BH CV ECHO MEAS - RV V1 VTI: 12.4 CM
BH CV ECHO MEAS - RVOT AREA: 3.1 CM^2
BH CV ECHO MEAS - RVOT DIAM: 2 CM
BH CV ECHO MEAS - SI(AO): 85.2 ML/M^2
BH CV ECHO MEAS - SI(CUBED): 46 ML/M^2
BH CV ECHO MEAS - SI(LVOT): 33.6 ML/M^2
BH CV ECHO MEAS - SI(MOD-SP2): 24.2 ML/M^2
BH CV ECHO MEAS - SI(MOD-SP4): 27.3 ML/M^2
BH CV ECHO MEAS - SI(TEICH): 39.3 ML/M^2
BH CV ECHO MEAS - SUP REN AO DIAM: 1.8 CM
BH CV ECHO MEAS - SV(AO): 154.6 ML
BH CV ECHO MEAS - SV(CUBED): 83.5 ML
BH CV ECHO MEAS - SV(LVOT): 60.9 ML
BH CV ECHO MEAS - SV(MOD-SP2): 44 ML
BH CV ECHO MEAS - SV(MOD-SP4): 49.5 ML
BH CV ECHO MEAS - SV(RVOT): 39 ML
BH CV ECHO MEAS - SV(TEICH): 71.3 ML
BH CV ECHO MEAS - TAPSE (>1.6): 2.2 CM2
BH CV NUCLEAR PRIOR STUDY: 3
BH CV STRESS BP STAGE 1: NORMAL
BH CV STRESS BP STAGE 2: NORMAL
BH CV STRESS DURATION MIN STAGE 1: 3
BH CV STRESS DURATION MIN STAGE 2: 2
BH CV STRESS DURATION SEC STAGE 1: 0
BH CV STRESS DURATION SEC STAGE 2: 38
BH CV STRESS GRADE STAGE 1: 10
BH CV STRESS GRADE STAGE 2: 12
BH CV STRESS HR STAGE 1: 135
BH CV STRESS HR STAGE 2: 150
BH CV STRESS METS STAGE 1: 5
BH CV STRESS METS STAGE 2: 7.5
BH CV STRESS PROTOCOL 1: NORMAL
BH CV STRESS RECOVERY BP: NORMAL MMHG
BH CV STRESS RECOVERY HR: 103 BPM
BH CV STRESS SPEED STAGE 1: 1.7
BH CV STRESS SPEED STAGE 2: 2.5
BH CV STRESS STAGE 1: 1
BH CV STRESS STAGE 2: 2
BH CV VAS BP RIGHT ARM: NORMAL MMHG
BH CV XLRA - RV BASE: 2.9 CM
BH CV XLRA - TDI S': 14 CM/SEC
BILIRUB SERPL-MCNC: <0.2 MG/DL (ref 0.2–1.2)
BUN BLD-MCNC: 15 MG/DL (ref 6–20)
BUN/CREAT SERPL: 31.9 (ref 7–25)
CALCIUM SPEC-SCNC: 8.7 MG/DL (ref 8.6–10.5)
CHLORIDE SERPL-SCNC: 102 MMOL/L (ref 98–107)
CHOLEST SERPL-MCNC: 170 MG/DL (ref 0–200)
CO2 SERPL-SCNC: 24 MMOL/L (ref 22–29)
CREAT BLD-MCNC: 0.47 MG/DL (ref 0.57–1)
DEPRECATED RDW RBC AUTO: 40.1 FL (ref 37–54)
E/E' RATIO: 9
EOSINOPHIL # BLD AUTO: 0.18 10*3/MM3 (ref 0.1–0.3)
EOSINOPHIL NFR BLD AUTO: 2 % (ref 0–4)
ERYTHROCYTE [DISTWIDTH] IN BLOOD BY AUTOMATED COUNT: 14 % (ref 11.5–14.5)
GFR SERPL CREATININE-BSD FRML MDRD: 144 ML/MIN/1.73
GLOBULIN UR ELPH-MCNC: 3.1 GM/DL
GLUCOSE BLD-MCNC: 222 MG/DL (ref 65–99)
GLUCOSE BLDC GLUCOMTR-MCNC: 103 MG/DL (ref 70–130)
GLUCOSE BLDC GLUCOMTR-MCNC: 151 MG/DL (ref 70–130)
HBA1C MFR BLD: 9 % (ref 4.8–5.6)
HCT VFR BLD AUTO: 33.7 % (ref 37–47)
HDLC SERPL-MCNC: 25 MG/DL (ref 40–60)
HGB BLD-MCNC: 11.1 G/DL (ref 12–16)
IMM GRANULOCYTES # BLD: 0.02 10*3/MM3 (ref 0–0.03)
IMM GRANULOCYTES NFR BLD: 0.2 % (ref 0–0.5)
LDLC SERPL CALC-MCNC: 89 MG/DL (ref 0–100)
LDLC/HDLC SERPL: 3.57 {RATIO}
LEFT ATRIUM VOLUME INDEX: 23 ML/M2
LV EF 2D ECHO EST: 61 %
LV EF NUC BP: 58 %
LYMPHOCYTES # BLD AUTO: 2.11 10*3/MM3 (ref 0.6–4.8)
LYMPHOCYTES NFR BLD AUTO: 22.9 % (ref 20–45)
MAXIMAL PREDICTED HEART RATE: 176 BPM
MCH RBC QN AUTO: 26.5 PG (ref 27–31)
MCHC RBC AUTO-ENTMCNC: 32.9 G/DL (ref 31–37)
MCV RBC AUTO: 80.4 FL (ref 81–99)
MONOCYTES # BLD AUTO: 0.52 10*3/MM3 (ref 0–1)
MONOCYTES NFR BLD AUTO: 5.6 % (ref 3–8)
NEUTROPHILS # BLD AUTO: 6.37 10*3/MM3 (ref 1.5–8.3)
NEUTROPHILS NFR BLD AUTO: 69 % (ref 45–70)
NRBC BLD MANUAL-RTO: 0 /100 WBC (ref 0–0)
PERCENT MAX PREDICTED HR: 86.36 %
PLATELET # BLD AUTO: 262 10*3/MM3 (ref 140–500)
PMV BLD AUTO: 10 FL (ref 7.4–10.4)
POTASSIUM BLD-SCNC: 3.7 MMOL/L (ref 3.5–5.2)
PROT SERPL-MCNC: 6.6 G/DL (ref 6–8.5)
RBC # BLD AUTO: 4.19 10*6/MM3 (ref 4.2–5.4)
SODIUM BLD-SCNC: 140 MMOL/L (ref 136–145)
STRESS BASELINE BP: NORMAL MMHG
STRESS BASELINE HR: 95 BPM
STRESS O2 SAT REST: 96 %
STRESS PERCENT HR: 102 %
STRESS POST ESTIMATED WORKLOAD: 7.1 METS
STRESS POST EXERCISE DUR MIN: 5 MIN
STRESS POST EXERCISE DUR SEC: 39 SEC
STRESS POST PEAK BP: NORMAL MMHG
STRESS POST PEAK HR: 152 BPM
STRESS TARGET HR: 150 BPM
TRIGL SERPL-MCNC: 279 MG/DL (ref 0–150)
TROPONIN T SERPL-MCNC: <0.01 NG/ML (ref 0–0.03)
VLDLC SERPL-MCNC: 55.8 MG/DL (ref 7–27)
WBC NRBC COR # BLD: 9.23 10*3/MM3 (ref 4.8–10.8)

## 2017-10-20 PROCEDURE — 93306 TTE W/DOPPLER COMPLETE: CPT

## 2017-10-20 PROCEDURE — 25010000002 PERFLUTREN (DEFINITY) 8.476 MG IN SODIUM CHLORIDE 0.9 % 10 ML INJECTION: Performed by: INTERNAL MEDICINE

## 2017-10-20 PROCEDURE — 80061 LIPID PANEL: CPT | Performed by: INTERNAL MEDICINE

## 2017-10-20 PROCEDURE — G0378 HOSPITAL OBSERVATION PER HR: HCPCS

## 2017-10-20 PROCEDURE — 85025 COMPLETE CBC W/AUTO DIFF WBC: CPT | Performed by: INTERNAL MEDICINE

## 2017-10-20 PROCEDURE — 93016 CV STRESS TEST SUPVJ ONLY: CPT | Performed by: INTERNAL MEDICINE

## 2017-10-20 PROCEDURE — A9500 TC99M SESTAMIBI: HCPCS | Performed by: INTERNAL MEDICINE

## 2017-10-20 PROCEDURE — 80053 COMPREHEN METABOLIC PANEL: CPT | Performed by: INTERNAL MEDICINE

## 2017-10-20 PROCEDURE — 78452 HT MUSCLE IMAGE SPECT MULT: CPT | Performed by: INTERNAL MEDICINE

## 2017-10-20 PROCEDURE — 84484 ASSAY OF TROPONIN QUANT: CPT | Performed by: INTERNAL MEDICINE

## 2017-10-20 PROCEDURE — 82962 GLUCOSE BLOOD TEST: CPT

## 2017-10-20 PROCEDURE — 78452 HT MUSCLE IMAGE SPECT MULT: CPT

## 2017-10-20 PROCEDURE — 0 TECHNETIUM SESTAMIBI: Performed by: INTERNAL MEDICINE

## 2017-10-20 PROCEDURE — 93306 TTE W/DOPPLER COMPLETE: CPT | Performed by: INTERNAL MEDICINE

## 2017-10-20 PROCEDURE — 99244 OFF/OP CNSLTJ NEW/EST MOD 40: CPT | Performed by: INTERNAL MEDICINE

## 2017-10-20 PROCEDURE — 93017 CV STRESS TEST TRACING ONLY: CPT

## 2017-10-20 PROCEDURE — 99217 PR OBSERVATION CARE DISCHARGE MANAGEMENT: CPT | Performed by: INTERNAL MEDICINE

## 2017-10-20 PROCEDURE — 93018 CV STRESS TEST I&R ONLY: CPT | Performed by: INTERNAL MEDICINE

## 2017-10-20 PROCEDURE — 83036 HEMOGLOBIN GLYCOSYLATED A1C: CPT | Performed by: INTERNAL MEDICINE

## 2017-10-20 RX ORDER — LISINOPRIL 10 MG/1
10 TABLET ORAL NIGHTLY
Status: DISCONTINUED | OUTPATIENT
Start: 2017-10-20 | End: 2017-10-20

## 2017-10-20 RX ORDER — CARVEDILOL 3.12 MG/1
3.12 TABLET ORAL EVERY 12 HOURS SCHEDULED
Status: DISCONTINUED | OUTPATIENT
Start: 2017-10-20 | End: 2017-10-20 | Stop reason: HOSPADM

## 2017-10-20 RX ORDER — LISINOPRIL 10 MG/1
10 TABLET ORAL EVERY 12 HOURS SCHEDULED
Status: DISCONTINUED | OUTPATIENT
Start: 2017-10-20 | End: 2017-10-20 | Stop reason: HOSPADM

## 2017-10-20 RX ORDER — CARVEDILOL 3.12 MG/1
3.12 TABLET ORAL EVERY 12 HOURS SCHEDULED
Qty: 60 TABLET | Refills: 1 | OUTPATIENT
Start: 2017-10-20 | End: 2018-03-27

## 2017-10-20 RX ORDER — NITROGLYCERIN 0.4 MG/1
0.4 TABLET SUBLINGUAL
Qty: 30 TABLET | Refills: 12 | OUTPATIENT
Start: 2017-10-20 | End: 2018-03-27

## 2017-10-20 RX ADMIN — TECHNETIUM TC-99M SESTAMIBI 1 DOSE: 1 INJECTION INTRAVENOUS at 10:15

## 2017-10-20 RX ADMIN — PERFLUTREN 3 ML: 6.52 INJECTION, SUSPENSION INTRAVENOUS at 07:54

## 2017-10-20 RX ADMIN — PANTOPRAZOLE SODIUM 40 MG: 40 TABLET, DELAYED RELEASE ORAL at 09:59

## 2017-10-20 RX ADMIN — TECHNETIUM TC-99M SESTAMIBI 1 DOSE: 1 INJECTION INTRAVENOUS at 09:00

## 2017-10-20 RX ADMIN — MELOXICAM 15 MG: 7.5 TABLET ORAL at 09:59

## 2017-10-20 RX ADMIN — LISINOPRIL 10 MG: 10 TABLET ORAL at 09:59

## 2017-10-20 RX ADMIN — CARVEDILOL 3.12 MG: 3.12 TABLET, FILM COATED ORAL at 09:59

## 2017-10-20 NOTE — DISCHARGE SUMMARY
HOSPITALIST SERVICES  @ [facility], [place]          [facility] Hospitalist Team    DISCHARGE SUMMARY        Jenni Youngker  1975  [id number]        Hospitalists Discharge Summary    Date of Admission: 10/19/2017  Date of Discharge:  10/20/2017            DIAGNOSES:      PRIMARY DIAGNOSES:        Chest Pain     Associated shortness of breath     Cardiac stress test showingMyocardial perfusion imaging indicates a small-sized, mildly severe area of ischemia located in the apex. Impressions are consistent with a low risk study     HTN     HLD     Type II DM     GERD     Former smoker     Morbid Obesity              SECONDARY DIAGNOSES:         As above           SURGICAL DIAGNOSES:           No hx of any surgeries      PCP  Patient Care Team:  ARTEMIO Black as PCP - General (Family Medicine)    Consults:   Consults     Date and Time Order Name Status Description    10/19/2017 1722 Inpatient Consult to Cardiology Completed           Operations and Procedures Performed:       Xr Chest 1 View    Result Date: 10/19/2017  Narrative: EXAM: AP portable chest.  DATE: 10/19/2017.  HISTORY: Chest tightness, inability to take a deep breath. Symptoms for 3 days. Hypertension today.  COMPARISON: None  FINDINGS: No acute airspace disease, pleural effusion or pneumothorax. Normal heart size. No acute osseous abnormality      Impression: No acute chest findings.  This report was finalized on 10/19/2017 2:53 PM by Dr. Britni Kiser MD.        Allergies:  is allergic to erythromycin and vicodin [hydrocodone-acetaminophen].    Conrado  not reviewed    Discharge Medications:   Jenni Lopez   Home Medication Instructions SHERLYN:[id number]    Printed on:10/20/17 1129   Medication Information                      carvedilol (COREG) 3.125 MG tablet  Take 1 tablet by mouth Every 12 (Twelve) Hours.             glipiZIDE (GLUCOTROL) 5 MG tablet  Take 5 mg by mouth 2 (Two) Times a Day Before Meals.    "          GLIPIZIDE PO  Take 25 mg by mouth 2 (Two) Times a Day.             Insulin Glargine (LANTUS SOLOSTAR SC)  Inject 70 Units under the skin Daily With Breakfast.             lisinopril (PRINIVIL,ZESTRIL) 10 MG tablet  Take 10 mg by mouth Daily.             meloxicam (MOBIC) 15 MG tablet  Take 15 mg by mouth Daily.             metFORMIN (GLUCOPHAGE) 1000 MG tablet  Take 1,000 mg by mouth 2 (Two) Times a Day With Meals.             nitroglycerin (NITROSTAT) 0.4 MG SL tablet  Place 1 tablet under the tongue Every 5 (Five) Minutes As Needed for Chest Pain for up to 1 dose. Take no more than 3 doses in 15 minutes.             pantoprazole (PROTONIX) 40 MG EC tablet  Take 40 mg by mouth Daily.             SITagliptin (JANUVIA) 100 MG tablet  Take 100 mg by mouth Daily.                 History of Present Illness:      Ms. Jenni Lopez is a 42 year old  female who is known to have hx of Type II DM, GERD and a former smoker presented to Urgebnt Care with hx of elevated BP and chest pain for 2 days. The chest pain started at rest and is associated with some shortness of breath, lightheadedness. The pain is like \"a bear hugging her.\" Patient presented to Urgent Care and she was advised to come to [facility]. The chest pain was relieved with 2 S/L NTG. Nothing else made pain better or worse. The pain radiated to both arms. No hx of syncope, diaphoresis or palpitations. She does take lisinopril 10 mg for renal protection with diabetes, but has not had any previous hypertensive history. Patient denies any sx of fever, headache, abdominal pain, nausea/ vomiting, dysuria, urgency/ frequency or any recent hx of blood in stool. No other medical history available.             Hospital Course     Patient was admitted on [facility]    Chest Pain: Pain got relieved after 2 SL NTG. Patient does not have hx of cheat pain, CAD or MI in the past. Will order serial Troponin, Echo in AM and cardiology consult " is requested. Hopefully patient will have cardiac stress test in AM     Associated shortness of breath: No hx of COPD or asthma. 96% Oxygen saturation at this time. D-dimer WNL     HTN: On Lisinopril. Last /83 (came down from 188/105)     HLD: Will check lipd profile in AM     Type II DM: On Insulin, Januvia, Metformin and Glipizide     GERD: On Pantoprazole     Former smoker: Quit in January 2017     Morbid Obesity: BMI almost 35. Nutritional consult     DVT Prophylaxis: On Inj Lovenox and SCDs     and her Troponins were normal and EKGa were unremarkable. Patient was seen by Dr Sánchez (Cardiology) and cardiac stress test done and it showed     Interpretation Summary      · Findings consistent with a normal ECG stress test.  · Left ventricular ejection fraction is normal (Calculated EF = 58%).  · Myocardial perfusion imaging indicates a small-sized, mildly severe area of ischemia located in the apex.  · Impressions are consistent with a low risk study.         As per Dr Sánchez's note  Assessment and Plan:  1. Chest pain - troponin negative ×3 and ECG shows no evidence of acute coronary syndrome - stress nuclear study today.   2. Hypertension - BP very high on admission.   3. Hyperlipidemia. High triglycerides and low HDL.  4. DM, type 2  5. Former Smoker - quit 1/2017  6. Obesity.   7. Mild anemia     If stress negative, ok to go home and f/u in office in 1 month.      Addendum:  Stress is low risk - OK to d/c to home with f/u in 1 month, treat medically.      Melina Sánchez MD  10/20/17  7:37 AM.  Time spent in reviewing chart, discussion and examination:             Patient will be discharged home        Last Lab Results:   Lab Results (most recent)     Procedure Component Value Units Date/Time    CBC & Differential [[id number]] Collected:  10/19/17 1430    Specimen:  Blood Updated:  10/19/17 1444    Narrative:       The following orders were created for panel order CBC & Differential.  Procedure                                Abnormality         Status                     ---------                               -----------         ------                     CBC Auto Differential[[id number]]        Abnormal            Final result                 Please view results for these tests on the individual orders.    CBC Auto Differential [[id number]]  (Abnormal) Collected:  10/19/17 1430    Specimen:  Blood Updated:  10/19/17 1444     WBC 9.63 10*3/mm3      RBC 4.20 10*6/mm3      Hemoglobin 11.2 (L) g/dL      Hematocrit 33.4 (L) %      MCV 79.5 (L) fL      MCH 26.7 (L) pg      MCHC 33.5 g/dL      RDW 13.8 %      RDW-SD 39.5 fl      MPV 9.8 fL      Platelets 264 10*3/mm3      Neutrophil % 65.9 %      Lymphocyte % 25.9 %      Monocyte % 5.0 %      Eosinophil % 2.2 %      Basophil % 0.4 %      Immature Grans % 0.6 (H) %      Neutrophils, Absolute 6.35 10*3/mm3      Lymphocytes, Absolute 2.49 10*3/mm3      Monocytes, Absolute 0.48 10*3/mm3      Eosinophils, Absolute 0.21 10*3/mm3      Basophils, Absolute 0.04 10*3/mm3      Immature Grans, Absolute 0.06 (H) 10*3/mm3      nRBC 0.0 /100 WBC     hCG, Serum, Qualitative [[id number]]  (Normal) Collected:  10/19/17 1430    Specimen:  Blood Updated:  10/19/17 1502     HCG Qualitative Negative    Troponin [[id number]]  (Normal) Collected:  10/19/17 1430    Specimen:  Blood Updated:  10/19/17 1506     Troponin T <0.010 ng/mL     Narrative:       Troponin T Reference Ranges:  Less than 0.03 ng/mL:    Negative for AMI  0.03 to 0.09 ng/mL:      Indeterminant for AMI  Greater than 0.09 ng/mL: Positive for AMI    Comprehensive Metabolic Panel [[id number]]  (Abnormal) Collected:  10/19/17 1430    Specimen:  Blood Updated:  10/19/17 1518     Glucose 234 (H) mg/dL      BUN 10 mg/dL      Creatinine 0.39 (L) mg/dL      Sodium 136 mmol/L      Potassium 3.5 mmol/L      Chloride 99 mmol/L      CO2 23.2 mmol/L      Calcium 8.6 mg/dL      Total Protein 6.8 g/dL      Albumin 3.90 g/dL      ALT  (SGPT) 37 (H) U/L      AST (SGOT) 27 U/L      Alkaline Phosphatase 60 U/L      Total Bilirubin <0.2 (L) mg/dL      eGFR Non African Amer >150 mL/min/1.73      Globulin 2.9 gm/dL      A/G Ratio 1.3 g/dL      BUN/Creatinine Ratio 25.6 (H)     Anion Gap 13.8 mmol/L     D-dimer, Quantitative [[id number]]  (Normal) Collected:  10/19/17 1430    Specimen:  Blood Updated:  10/19/17 1518     D-Dimer, Quantitative 0.38 MCGFEU/mL     Narrative:       Can be elevated in, but is not diagnostic for deep vein thrombosis (DVT) or pulmonary embolis (PE).  It is also elevated in other medical conditions.  Clinical correlation is required.  The negative cut-off value for the D-Dimer is 0.50 mcg FEU/mL for DVT and PE.    Protime-INR [[id number]]  (Normal) Collected:  10/19/17 1430    Specimen:  Blood Updated:  10/19/17 1518     Protime 12.9 Seconds      INR 0.97    Narrative:       Therapeutic Ranges for INR: 2.0-3.0 (PT 20-30)                              2.5-3.5 (PT 25-34)    aPTT [[id number]]  (Normal) Collected:  10/19/17 1430    Specimen:  Blood Updated:  10/19/17 1520     PTT 28.7 seconds     Narrative:       PTT = The equivalent PTT values for the therapeutic range of heparin levels at 0.1 to 0.7 U/ml are 53 to 110 seconds.    Troponin [[id number]]  (Normal) Collected:  10/19/17 1601    Specimen:  Blood Updated:  10/19/17 1628     Troponin T <0.010 ng/mL     Narrative:       Troponin T Reference Ranges:  Less than 0.03 ng/mL:    Negative for AMI  0.03 to 0.09 ng/mL:      Indeterminant for AMI  Greater than 0.09 ng/mL: Positive for AMI    POC Glucose Fingerstick [[id number]]  (Abnormal) Collected:  10/19/17 2033    Specimen:  Blood Updated:  10/19/17 2039     Glucose 197 (H) mg/dL     Narrative:       Meter: [id number] : [id number] Vivi Hand [facility]    Troponin [[id number]]  (Normal) Collected:  10/19/17 2207    Specimen:  Blood Updated:  10/19/17 2248     Troponin T <0.010 ng/mL     Narrative:       Troponin T Reference  Ranges:  Less than 0.03 ng/mL:    Negative for AMI  0.03 to 0.09 ng/mL:      Indeterminant for AMI  Greater than 0.09 ng/mL: Positive for AMI    CBC Auto Differential [[id number]]  (Abnormal) Collected:  10/20/17 0328    Specimen:  Blood Updated:  10/20/17 0359     WBC 9.23 10*3/mm3      RBC 4.19 (L) 10*6/mm3      Hemoglobin 11.1 (L) g/dL      Hematocrit 33.7 (L) %      MCV 80.4 (L) fL      MCH 26.5 (L) pg      MCHC 32.9 g/dL      RDW 14.0 %      RDW-SD 40.1 fl      MPV 10.0 fL      Platelets 262 10*3/mm3      Neutrophil % 69.0 %      Lymphocyte % 22.9 %      Monocyte % 5.6 %      Eosinophil % 2.0 %      Basophil % 0.3 %      Immature Grans % 0.2 %      Neutrophils, Absolute 6.37 10*3/mm3      Lymphocytes, Absolute 2.11 10*3/mm3      Monocytes, Absolute 0.52 10*3/mm3      Eosinophils, Absolute 0.18 10*3/mm3      Basophils, Absolute 0.03 10*3/mm3      Immature Grans, Absolute 0.02 10*3/mm3      nRBC 0.0 /100 WBC     Troponin [[id number]]  (Normal) Collected:  10/20/17 0328    Specimen:  Blood Updated:  10/20/17 0512     Troponin T <0.010 ng/mL     Narrative:       Troponin T Reference Ranges:  Less than 0.03 ng/mL:    Negative for AMI  0.03 to 0.09 ng/mL:      Indeterminant for AMI  Greater than 0.09 ng/mL: Positive for AMI    Comprehensive Metabolic Panel [[id number]]  (Abnormal) Collected:  10/20/17 0328    Specimen:  Blood Updated:  10/20/17 0519     Glucose 222 (H) mg/dL      BUN 15 mg/dL      Creatinine 0.47 (L) mg/dL      Sodium 140 mmol/L      Potassium 3.7 mmol/L      Chloride 102 mmol/L      CO2 24.0 mmol/L      Calcium 8.7 mg/dL      Total Protein 6.6 g/dL      Albumin 3.50 g/dL      ALT (SGPT) 48 (H) U/L      AST (SGOT) 40 (H) U/L      Alkaline Phosphatase 62 U/L      Total Bilirubin <0.2 (L) mg/dL      eGFR Non African Amer 144 mL/min/1.73      Globulin 3.1 gm/dL      A/G Ratio 1.1 g/dL      BUN/Creatinine Ratio 31.9 (H)     Anion Gap 14.0 mmol/L     Lipid Panel [[id number]]  (Abnormal) Collected:  10/20/17  0328    Specimen:  Blood Updated:  10/20/17 0604     Total Cholesterol 170 mg/dL      Triglycerides 279 (H) mg/dL      HDL Cholesterol 25 (L) mg/dL      LDL Cholesterol  89 mg/dL      VLDL Cholesterol 55.8 (H) mg/dL      LDL/HDL Ratio 3.57    POC Glucose Fingerstick [[id number]]  (Abnormal) Collected:  10/20/17 0736    Specimen:  Blood Updated:  10/20/17 0749     Glucose 151 (H) mg/dL     Narrative:       Meter: [id number] : [id number] Betty Perkins NURSING ASSISTANT    Hemoglobin A1c [[id number]]  (Abnormal) Collected:  10/20/17 0328    Specimen:  Blood Updated:  10/20/17 0901     Hemoglobin A1C 9.00 (H) %     Narrative:       Hemoglobin A1C Ranges:    Increased Risk for Diabetes  5.7% to 6.4%  Diabetes                     >= 6.5%  Diabetic Goal                < 7.0%        Imaging Results (most recent)     Procedure Component Value Units Date/Time    XR Chest 1 View [[id number]] Collected:  10/19/17 1452     Updated:  10/19/17 1455    Narrative:       EXAM: AP portable chest.     DATE: 10/19/2017.     HISTORY: Chest tightness, inability to take a deep breath. Symptoms for  3 days. Hypertension today.     COMPARISON: None     FINDINGS: No acute airspace disease, pleural effusion or pneumothorax.  Normal heart size. No acute osseous abnormality       Impression:       No acute chest findings.     This report was finalized on 10/19/2017 2:53 PM by Dr. Britni Kiser MD.             PROCEDURES      Condition on Discharge:  Clinically and hemodynamically stable    Physical Exam at Discharge  Vital Signs  Temp:  [97.4 °F (36.3 °C)-99.2 °F (37.3 °C)] 98 °F (36.7 °C)  Heart Rate:  [] 83  Resp:  [13-20] 16  BP: (119-188)/() 119/75    Physical Exam   Constitutional: Patient appears well-developed and well-nourished and in no acute distress   HEENT:   Head: Normocephalic and atraumatic.   Eyes:  Pupils are equal, round, and reactive to light. EOM are intact. Sclera are anicteric and non-injected.  Mouth and  Throat: Patient has moist mucous membranes. Oropharynx is clear of any erythema or exudate.     Neck: Neck supple. No JVD present. No thyromegaly present. No lymphadenopathy present.  Cardiovascular: Regular rate, regular rhythm, S1 normal and S2 normal.  Exam reveals no gallop and no friction rub.  No murmur heard.  Pulmonary/Chest: Lungs are clear to auscultation bilaterally. No respiratory distress. No wheezes. No rhonchi. No rales.   Abdominal: Soft. Bowel sounds are normal. No distension and no mass. There is no hepatosplenomegaly. There is no tenderness.   Musculoskeletal: Normal Muscle tone  Extremities: No edema. Pulses are palpable in all 4 extremities.  Neurological: Patient is alert and oriented to person, place, and time. Cranial nerves II-XII are grossly intact with no focal deficits.  Skin: Skin is warm. No rash noted. Nails show no clubbing.  No cyanosis or erythema.        Discharge Disposition  To home    Visiting Nurse:    No     Home PT/OT:  No     Home Safety Evaluation:  No     DME  N/A    Discharge Diet:           Dietary Orders            Start     Ordered    10/20/17 0001  NPO Diet  Diet Effective Midnight      10/19/17 1718          Activity at Discharge:  Ad Radha    Pre-discharge education  N/A      Follow-up Appointments  No future appointments.      Test Results Pending at Discharge       Oziel Buchanan MD  10/20/17  11:29 AM    Time: Discharge 35 min (if over 30 minutes give explanation as to why it took greater than 30 minutes)  Discharge over 30 min (if over 30 minutes give explanation as to why it took greater than 30 minutes)  Secondary to:  Coordination of home care  D/W case management  Medication reconciliation

## 2017-10-20 NOTE — NURSING NOTE
Case Management Discharge Note    Final Note: discharged home to self care.     Discharge Placement     No information found             Discharge Codes: 01  Discharge to home

## 2017-10-20 NOTE — DISCHARGE INSTR - APPOINTMENTS
Dr Sánchez - call to schedule appt at [phone] for one month follow up appt..  Dr sánchez [id number] [place] [address]

## 2017-10-20 NOTE — CONSULTS
Date of Hospital Visit: [unfilled]ENC@  Encounter Provider: Melina Sánchez MD  Place of Service: [facility] CARDIOLOGY  Patient Name: Jenni Lopez  :1975  Referral Provider: cain      Chief complaint    Chest pain and hypertension    History of Present Illness    Mrs Lopez is here for chest tightness.  Yesterday, she was in her usual state of health.  She began having bilateral arm tightness which went into her chest as a tightness as well.  She is mildly diaphoretic but not nauseated or short winded.  She became very concerned because it did not get any better.  She presented to the emergency department her blood pressure was elevated at 188/105.  She was treated with a GI cocktail, aspirin, nitroglycerin.  The arm tightness got better.  Her blood pressure started coming down.  Last evening she started having arm and chest tightness again.  Her blood pressure again became elevated.  She was given sublingual nitroglycerin and relieved her pain.    Today she is chest pain-free.    She has diabetes mellitus type 2, hypertension and hyperlipidemia.  She is morbidly obese and sedentary.  She quit smoking 10 months ago.  She has not had any heart rhythm disturbances, dizziness or syncope.  She denies GERD, vomiting blood or blood in stool.  She denies COPD or renal disease.  She has no history of strokes, blood clots or cancer.    She does not consume a lot of alcohol.  In her family, she has no premature cardiovascular disease.    Past Medical History:   Diagnosis Date   • Diabetes mellitus        History reviewed. No pertinent surgical history.    Prescriptions Prior to Admission   Medication Sig Dispense Refill Last Dose   • glipiZIDE (GLUCOTROL) 5 MG tablet Take 5 mg by mouth 2 (Two) Times a Day Before Meals.   10/19/2017 at Unknown time   • Insulin Glargine (LANTUS SOLOSTAR SC) Inject 70 Units under the skin Daily With Breakfast.   10/19/2017 at Unknown time   • lisinopril  (PRINIVIL,ZESTRIL) 10 MG tablet Take 10 mg by mouth Daily.   10/19/2017 at Unknown time   • meloxicam (MOBIC) 15 MG tablet Take 15 mg by mouth Daily.   10/19/2017 at Unknown time   • metFORMIN (GLUCOPHAGE) 1000 MG tablet Take 1,000 mg by mouth 2 (Two) Times a Day With Meals.   10/19/2017 at Unknown time   • pantoprazole (PROTONIX) 40 MG EC tablet Take 40 mg by mouth Daily.      • SITagliptin (JANUVIA) 100 MG tablet Take 100 mg by mouth Daily.   10/19/2017 at Unknown time   • GLIPIZIDE PO Take 25 mg by mouth 2 (Two) Times a Day.   5/4/2017 at Unknown time       Current Meds  Scheduled Meds:  enoxaparin 40 mg Subcutaneous Daily   glipiZIDE 5 mg Oral BID AC   insulin detemir 60 Units Subcutaneous Nightly   lisinopril 10 mg Oral Nightly   meloxicam 15 mg Oral Daily   metFORMIN 1,000 mg Oral BID With Meals   pantoprazole 40 mg Oral Daily     Continuous Infusions:   PRN Meds:.•  acetaminophen  •  nitroglycerin  •  sodium chloride  •  Insert peripheral IV **AND** sodium chloride  •  sodium chloride    Allergies as of 10/19/2017 - Jn as Reviewed 10/19/2017   Allergen Reaction Noted   • Erythromycin  10/19/2017   • Vicodin [hydrocodone-acetaminophen]  05/05/2017       Social History     Social History   • Marital status:      Spouse name: N/A   • Number of children: N/A   • Years of education: N/A     Occupational History   • Not on file.     Social History Main Topics   • Smoking status: Former Smoker     Types: Cigarettes     Quit date: 1/19/2017   • Smokeless tobacco: Not on file   • Alcohol use No   • Drug use: No   • Sexual activity: Defer     Other Topics Concern   • Not on file     Social History Narrative   • No narrative on file       History reviewed. No pertinent family history.    REVIEW OF SYSTEMS:   12 point ROS was performed and is negative except as outlined in HPI         Objective:   Temp:  [97.4 °F (36.3 °C)-99.2 °F (37.3 °C)] 98.6 °F (37 °C)  Heart Rate:  [] 108  Resp:  [13-20] 20  BP:  "(120-188)/() 131/80  Body mass index is 34.41 kg/(m^2).  Flowsheet Rows         First Filed Value    Admission Height  61\" (154.9 cm) Documented at 10/19/2017 1320    Admission Weight  180 lb (81.6 kg) Documented at 10/19/2017 1320        Vitals:    10/20/17 0605   BP: 131/80   Pulse: 108   Resp: 20   Temp: 98.6 °F (37 °C)   SpO2: 99%       General Appearance:    Alert, cooperative, in no acute distress   Head:    Normocephalic, without obvious abnormality, atraumatic   Eyes:            Lids and lashes normal, conjunctivae and sclerae normal, no   icterus, no pallor, corneas clear, PERRLA   Ears:    Ears appear intact with no abnormalities noted   Throat:   No oral lesions, no thrush, oral mucosa moist   Neck:   No adenopathy, supple, trachea midline, no thyromegaly, no   carotid bruit, no JVD   Back:     No kyphosis present, no scoliosis present, no skin lesions, erythema or scars, no tenderness to percussion or palpation, range of motion normal   Lungs:     Clear to auscultation,respirations regular, even and unlabored    Heart:    Regular rhythm and normal rate, normal S1 and S2, no murmur, no gallop, no rub, no click   Chest Wall:    No abnormalities observed   Abdomen:     Normal bowel sounds, no masses, no organomegaly, soft        non-tender, non-distended, no guarding, no rebound  tenderness   Extremities:   Moves all extremities well, no edema, no cyanosis, no redness   Pulses:   Pulses palpable and equal bilaterally. Normal radial, carotid, dorsalis pedis and posterior tibial pulses bilaterally.    Skin:  Psychiatric:   No bleeding, bruising or rash    Alert and oriented x 3, normal mood and affect           Lab Review:      Results from last 7 days  Lab Units 10/20/17  0328   SODIUM mmol/L 140   POTASSIUM mmol/L 3.7   CHLORIDE mmol/L 102   CO2 mmol/L 24.0   BUN mg/dL 15   CREATININE mg/dL 0.47*   CALCIUM mg/dL 8.7   BILIRUBIN mg/dL <0.2*   ALK PHOS U/L 62   ALT (SGPT) U/L 48*   AST (SGOT) U/L 40* "   GLUCOSE mg/dL 222*       Results from last 7 days  Lab Units 10/20/17  0328 10/19/17  2207 10/19/17  1601   TROPONIN T ng/mL <0.010 <0.010 <0.010         Results from last 7 days  Lab Units 10/20/17  0328 10/19/17  1430   WBC 10*3/mm3 9.23 9.63   HEMOGLOBIN g/dL 11.1* 11.2*   HEMATOCRIT % 33.7* 33.4*   PLATELETS 10*3/mm3 262 264       Results from last 7 days  Lab Units 10/19/17  1430   INR  0.97   APTT seconds 28.7             I personally viewed and interpreted the patient's EKG/Telemetry data  )  Patient Active Problem List   Diagnosis   • Other chest pain     Assessment and Plan:  1. Chest pain - troponin negative ×3 and ECG shows no evidence of acute coronary syndrome - stress nuclear study today.   2. Hypertension - BP very high on admission.   3. Hyperlipidemia. High triglycerides and low HDL.  4. DM, type 2  5. Former Smoker - quit 1/2017  6. Obesity.   7. Mild anemia    If stress negative, ok to go home and f/u in office in 1 month.     Addendum:  Stress is low risk - OK to d/c to home with f/u in 1 month, treat medically.     Melina Sánchez MD  10/20/17  7:37 AM.  Time spent in reviewing chart, discussion and examination:

## 2018-03-03 ENCOUNTER — HOSPITAL ENCOUNTER (EMERGENCY)
Facility: HOSPITAL | Age: 43
Discharge: HOME | End: 2018-03-03
Attending: EMERGENCY MEDICINE | Admitting: EMERGENCY MEDICINE

## 2018-03-03 VITALS
HEART RATE: 89 BPM | HEIGHT: 60 IN | SYSTOLIC BLOOD PRESSURE: 108 MMHG | TEMPERATURE: 98 F | DIASTOLIC BLOOD PRESSURE: 67 MMHG | OXYGEN SATURATION: 96 % | RESPIRATION RATE: 16 BRPM | BODY MASS INDEX: 33.38 KG/M2 | WEIGHT: 170 LBS

## 2018-03-03 DIAGNOSIS — L02.831 CARBUNCLE OF SCALP: Primary | ICD-10-CM

## 2018-03-03 PROCEDURE — 87186 SC STD MICRODIL/AGAR DIL: CPT | Performed by: EMERGENCY MEDICINE

## 2018-03-03 PROCEDURE — 87147 CULTURE TYPE IMMUNOLOGIC: CPT | Performed by: EMERGENCY MEDICINE

## 2018-03-03 PROCEDURE — 87205 SMEAR GRAM STAIN: CPT | Performed by: EMERGENCY MEDICINE

## 2018-03-03 PROCEDURE — 87070 CULTURE OTHR SPECIMN AEROBIC: CPT | Performed by: EMERGENCY MEDICINE

## 2018-03-03 PROCEDURE — 99283 EMERGENCY DEPT VISIT LOW MDM: CPT

## 2018-03-03 PROCEDURE — 10060 I&D ABSCESS SIMPLE/SINGLE: CPT | Performed by: EMERGENCY MEDICINE

## 2018-03-03 RX ORDER — HYDROCHLOROTHIAZIDE 25 MG/1
25 TABLET ORAL DAILY
COMMUNITY

## 2018-03-03 RX ORDER — OXYCODONE HYDROCHLORIDE AND ACETAMINOPHEN 5; 325 MG/1; MG/1
1 TABLET ORAL ONCE
Status: COMPLETED | OUTPATIENT
Start: 2018-03-03 | End: 2018-03-03

## 2018-03-03 RX ORDER — GINSENG 100 MG
CAPSULE ORAL ONCE
Status: COMPLETED | OUTPATIENT
Start: 2018-03-03 | End: 2018-03-03

## 2018-03-03 RX ORDER — CEPHALEXIN 500 MG/1
500 CAPSULE ORAL 3 TIMES DAILY
Qty: 21 CAPSULE | Refills: 0 | Status: SHIPPED | OUTPATIENT
Start: 2018-03-03 | End: 2018-03-10

## 2018-03-03 RX ORDER — SULFAMETHOXAZOLE AND TRIMETHOPRIM 800; 160 MG/1; MG/1
1 TABLET ORAL 2 TIMES DAILY
Qty: 14 TABLET | Refills: 0 | Status: SHIPPED | OUTPATIENT
Start: 2018-03-03 | End: 2018-03-10

## 2018-03-03 RX ORDER — LIDOCAINE HYDROCHLORIDE 20 MG/ML
10 INJECTION, SOLUTION INFILTRATION; PERINEURAL ONCE
Status: COMPLETED | OUTPATIENT
Start: 2018-03-03 | End: 2018-03-03

## 2018-03-03 RX ORDER — OXYCODONE HYDROCHLORIDE AND ACETAMINOPHEN 5; 325 MG/1; MG/1
1 TABLET ORAL EVERY 6 HOURS PRN
Qty: 25 TABLET | Refills: 0 | Status: SHIPPED | OUTPATIENT
Start: 2018-03-03 | End: 2020-03-03

## 2018-03-03 RX ADMIN — LIDOCAINE HYDROCHLORIDE 10 ML: 20 INJECTION, SOLUTION INFILTRATION; PERINEURAL at 08:40

## 2018-03-03 RX ADMIN — Medication 1 APPLICATION: at 09:01

## 2018-03-03 RX ADMIN — OXYCODONE HYDROCHLORIDE AND ACETAMINOPHEN 1 TABLET: 5; 325 TABLET ORAL at 09:15

## 2018-03-03 NOTE — DISCHARGE INSTRUCTIONS
Medications directed.  Gently wash the area 3 times daily with antibacterial soap, pat dry, apply Neosporin or bacitracin.  Continue till healed.  Follow-up with Dr. Barcenas in 2 days for wound check, sooner if needed.  Return to ED for signs of worsening infection, medical emergencies.    Skin Abscess  A skin abscess is an infected area on or under your skin that contains pus and other material. An abscess can happen almost anywhere on your body. Some abscesses break open (rupture) on their own. Most continue to get worse unless they are treated. The infection can spread deeper into the body and into your blood, which can make you feel sick. Treatment usually involves draining the abscess.  Follow these instructions at home:  Abscess Care   · If you have an abscess that has not drained, place a warm, clean, wet washcloth over the abscess several times a day. Do this as told by your doctor.  · Follow instructions from your doctor about how to take care of your abscess. Make sure you:  ¨ Cover the abscess with a bandage (dressing).  ¨ Change your bandage or gauze as told by your doctor.  ¨ Wash your hands with soap and water before you change the bandage or gauze. If you cannot use soap and water, use hand .  · Check your abscess every day for signs that the infection is getting worse. Check for:  ¨ More redness, swelling, or pain.  ¨ More fluid or blood.  ¨ Warmth.  ¨ More pus or a bad smell.  Medicines     · Take over-the-counter and prescription medicines only as told by your doctor.  · If you were prescribed an antibiotic medicine, take it as told by your doctor. Do not stop taking the antibiotic even if you start to feel better.  General instructions   · To avoid spreading the infection:  ¨ Do not share personal care items, towels, or hot tubs with others.  ¨ Avoid making skin-to-skin contact with other people.  · Keep all follow-up visits as told by your doctor. This is important.  Contact a doctor  "if:  · You have more redness, swelling, or pain around your abscess.  · You have more fluid or blood coming from your abscess.  · Your abscess feels warm when you touch it.  · You have more pus or a bad smell coming from your abscess.  · You have a fever.  · Your muscles ache.  · You have chills.  · You feel sick.  Get help right away if:  · You have very bad (severe) pain.  · You see red streaks on your skin spreading away from the abscess.  This information is not intended to replace advice given to you by your health care provider. Make sure you discuss any questions you have with your health care provider.  Document Released: 06/05/2009 Document Revised: 08/13/2017 Document Reviewed: 10/26/2016  [employer] Interactive Patient Education © 2017 [employer]      Hypertension  Hypertension, commonly called high blood pressure, is when the force of blood pumping through the arteries is too strong. The arteries are the blood vessels that carry blood from the heart throughout the body. Hypertension forces the heart to work harder to pump blood and may cause arteries to become narrow or stiff. Having untreated or uncontrolled hypertension can cause heart attacks, strokes, kidney disease, and other problems.  A blood pressure reading consists of a higher number over a lower number. Ideally, your blood pressure should be below 120/80. The first (\"top\") number is called the systolic pressure. It is a measure of the pressure in your arteries as your heart beats. The second (\"bottom\") number is called the diastolic pressure. It is a measure of the pressure in your arteries as the heart relaxes.  What are the causes?  The cause of this condition is not known.  What increases the risk?  Some risk factors for high blood pressure are under your control. Others are not.  Factors you can change   · Smoking.  · Having type 2 diabetes mellitus, high cholesterol, or both.  · Not getting enough exercise or physical activity.  · Being " overweight.  · Having too much fat, sugar, calories, or salt (sodium) in your diet.  · Drinking too much alcohol.  Factors that are difficult or impossible to change   · Having chronic kidney disease.  · Having a family history of high blood pressure.  · Age. Risk increases with age.  · Race. You may be at higher risk if you are -American.  · Gender. Men are at higher risk than women before age 45. After age 65, women are at higher risk than men.  · Having obstructive sleep apnea.  · Stress.  What are the signs or symptoms?  Extremely high blood pressure (hypertensive crisis) may cause:  · Headache.  · Anxiety.  · Shortness of breath.  · Nosebleed.  · Nausea and vomiting.  · Severe chest pain.  · Jerky movements you cannot control (seizures).  How is this diagnosed?  This condition is diagnosed by measuring your blood pressure while you are seated, with your arm resting on a surface. The cuff of the blood pressure monitor will be placed directly against the skin of your upper arm at the level of your heart. It should be measured at least twice using the same arm. Certain conditions can cause a difference in blood pressure between your right and left arms.  Certain factors can cause blood pressure readings to be lower or higher than normal (elevated) for a short period of time:  · When your blood pressure is higher when you are in a health care provider's office than when you are at home, this is called white coat hypertension. Most people with this condition do not need medicines.  · When your blood pressure is higher at home than when you are in a health care provider's office, this is called masked hypertension. Most people with this condition may need medicines to control blood pressure.  If you have a high blood pressure reading during one visit or you have normal blood pressure with other risk factors:  · You may be asked to return on a different day to have your blood pressure checked again.  · You may  be asked to monitor your blood pressure at home for 1 week or longer.  If you are diagnosed with hypertension, you may have other blood or imaging tests to help your health care provider understand your overall risk for other conditions.  How is this treated?  This condition is treated by making healthy lifestyle changes, such as eating healthy foods, exercising more, and reducing your alcohol intake. Your health care provider may prescribe medicine if lifestyle changes are not enough to get your blood pressure under control, and if:  · Your systolic blood pressure is above 130.  · Your diastolic blood pressure is above 80.  Your personal target blood pressure may vary depending on your medical conditions, your age, and other factors.  Follow these instructions at home:  Eating and drinking   · Eat a diet that is high in fiber and potassium, and low in sodium, added sugar, and fat. An example eating plan is called the DASH (Dietary Approaches to Stop Hypertension) diet. To eat this way:  ¨ Eat plenty of fresh fruits and vegetables. Try to fill half of your plate at each meal with fruits and vegetables.  ¨ Eat whole grains, such as whole wheat pasta, brown rice, or whole grain bread. Fill about one quarter of your plate with whole grains.  ¨ Eat or drink low-fat dairy products, such as skim milk or low-fat yogurt.  ¨ Avoid fatty cuts of meat, processed or cured meats, and poultry with skin. Fill about one quarter of your plate with lean proteins, such as fish, chicken without skin, beans, eggs, and tofu.  ¨ Avoid premade and processed foods. These tend to be higher in sodium, added sugar, and fat.  · Reduce your daily sodium intake. Most people with hypertension should eat less than 1,500 mg of sodium a day.  · Limit alcohol intake to no more than 1 drink a day for nonpregnant women and 2 drinks a day for men. One drink equals 12 oz of beer, 5 oz of wine, or 1½ oz of hard liquor.  Lifestyle   · Work with your health  care provider to maintain a healthy body weight or to lose weight. Ask what an ideal weight is for you.  · Get at least 30 minutes of exercise that causes your heart to beat faster (aerobic exercise) most days of the week. Activities may include walking, swimming, or biking.  · Include exercise to strengthen your muscles (resistance exercise), such as pilates or lifting weights, as part of your weekly exercise routine. Try to do these types of exercises for 30 minutes at least 3 days a week.  · Do not use any products that contain nicotine or tobacco, such as cigarettes and e-cigarettes. If you need help quitting, ask your health care provider.  · Monitor your blood pressure at home as told by your health care provider.  · Keep all follow-up visits as told by your health care provider. This is important.  Medicines   · Take over-the-counter and prescription medicines only as told by your health care provider. Follow directions carefully. Blood pressure medicines must be taken as prescribed.  · Do not skip doses of blood pressure medicine. Doing this puts you at risk for problems and can make the medicine less effective.  · Ask your health care provider about side effects or reactions to medicines that you should watch for.  Contact a health care provider if:  · You think you are having a reaction to a medicine you are taking.  · You have headaches that keep coming back (recurring).  · You feel dizzy.  · You have swelling in your ankles.  · You have trouble with your vision.  Get help right away if:  · You develop a severe headache or confusion.  · You have unusual weakness or numbness.  · You feel faint.  · You have severe pain in your chest or abdomen.  · You vomit repeatedly.  · You have trouble breathing.  Summary  · Hypertension is when the force of blood pumping through your arteries is too strong. If this condition is not controlled, it may put you at risk for serious complications.  · Your personal target  blood pressure may vary depending on your medical conditions, your age, and other factors. For most people, a normal blood pressure is less than 120/80.  · Hypertension is treated with lifestyle changes, medicines, or a combination of both. Lifestyle changes include weight loss, eating a healthy, low-sodium diet, exercising more, and limiting alcohol.  This information is not intended to replace advice given to you by your health care provider. Make sure you discuss any questions you have with your health care provider.  Document Released: 12/18/2006 Document Revised: 11/15/2017 Document Reviewed: 11/15/2017  [employer] Interactive Patient Education © 2017 [employer]
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
## 2018-03-03 NOTE — ED NOTES
Pt states that her pain is 8/10 down her neck and head; states that she has a history of cysts that burst on her head.  Believes this one to be infected.     Meenu Abraham RN  03/03/18 0848

## 2018-03-03 NOTE — ED NOTES
MD at bedside; draining abcleeann Abraham RN  03/03/18 0854       Meenu Abraham RN  03/03/18 0857

## 2018-03-05 LAB
BACTERIA SPEC AEROBE CULT: ABNORMAL
BACTERIA SPEC AEROBE CULT: ABNORMAL
GRAM STN SPEC: ABNORMAL
GRAM STN SPEC: ABNORMAL

## 2018-03-20 ENCOUNTER — OFFICE VISIT (OUTPATIENT)
Dept: OBSTETRICS AND GYNECOLOGY | Facility: CLINIC | Age: 43
End: 2018-03-20

## 2018-03-20 VITALS
DIASTOLIC BLOOD PRESSURE: 82 MMHG | WEIGHT: 172 LBS | HEIGHT: 60 IN | SYSTOLIC BLOOD PRESSURE: 134 MMHG | BODY MASS INDEX: 33.77 KG/M2

## 2018-03-20 DIAGNOSIS — Z01.419 PAP SMEAR, LOW-RISK: Primary | ICD-10-CM

## 2018-03-20 DIAGNOSIS — Z91.89 RELIES ON PARTNER VASECTOMY FOR CONTRACEPTION: ICD-10-CM

## 2018-03-20 DIAGNOSIS — Z00.00 ANNUAL PHYSICAL EXAM: ICD-10-CM

## 2018-03-20 DIAGNOSIS — E11.9 TYPE 2 DIABETES MELLITUS WITHOUT COMPLICATION, WITHOUT LONG-TERM CURRENT USE OF INSULIN (HCC): ICD-10-CM

## 2018-03-20 DIAGNOSIS — Z13.9 SCREENING FOR CONDITION: ICD-10-CM

## 2018-03-20 DIAGNOSIS — Z11.51 SPECIAL SCREENING EXAMINATION FOR HUMAN PAPILLOMAVIRUS (HPV): ICD-10-CM

## 2018-03-20 LAB
B-HCG UR QL: NEGATIVE
BILIRUB BLD-MCNC: NEGATIVE MG/DL
CLARITY, POC: CLEAR
COLOR UR: YELLOW
GLUCOSE UR STRIP-MCNC: ABNORMAL MG/DL
INTERNAL NEGATIVE CONTROL: NEGATIVE
INTERNAL POSITIVE CONTROL: POSITIVE
KETONES UR QL: NEGATIVE
LEUKOCYTE EST, POC: NEGATIVE
Lab: NORMAL
NITRITE UR-MCNC: NEGATIVE MG/ML
PH UR: 5 [PH] (ref 5–8)
PROT UR STRIP-MCNC: NEGATIVE MG/DL
RBC # UR STRIP: NEGATIVE /UL
SP GR UR: 1.03 (ref 1–1.03)
UROBILINOGEN UR QL: NORMAL

## 2018-03-20 PROCEDURE — 99386 PREV VISIT NEW AGE 40-64: CPT | Performed by: OBSTETRICS & GYNECOLOGY

## 2018-03-20 PROCEDURE — 81002 URINALYSIS NONAUTO W/O SCOPE: CPT | Performed by: OBSTETRICS & GYNECOLOGY

## 2018-03-20 PROCEDURE — 81025 URINE PREGNANCY TEST: CPT | Performed by: OBSTETRICS & GYNECOLOGY

## 2018-03-20 RX ORDER — PEN NEEDLE, DIABETIC 29 G X1/2"
NEEDLE, DISPOSABLE MISCELLANEOUS
Refills: 5 | COMMUNITY
Start: 2018-01-02

## 2018-03-20 RX ORDER — LISINOPRIL 40 MG/1
TABLET ORAL
Refills: 1 | COMMUNITY
Start: 2018-01-26 | End: 2020-03-03
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
## 2018-03-22 LAB
CYTOLOGIST CVX/VAG CYTO: NORMAL
CYTOLOGY CVX/VAG DOC THIN PREP: NORMAL
DX ICD CODE: NORMAL
HIV 1 & 2 AB SER-IMP: NORMAL
HPV I/H RISK 1 DNA CVX QL PROBE+SIG AMP: NEGATIVE
OTHER STN SPEC: NORMAL
PATH REPORT.FINAL DX SPEC: NORMAL
STAT OF ADQ CVX/VAG CYTO-IMP: NORMAL

## 2018-03-26 ENCOUNTER — HOSPITAL ENCOUNTER (OUTPATIENT)
Dept: MAMMOGRAPHY | Facility: HOSPITAL | Age: 43
Discharge: HOME | End: 2018-03-26
Attending: OBSTETRICS & GYNECOLOGY | Admitting: OBSTETRICS & GYNECOLOGY

## 2018-03-26 DIAGNOSIS — Z13.9 SCREENING FOR CONDITION: ICD-10-CM

## 2018-03-26 PROCEDURE — 77063 BREAST TOMOSYNTHESIS BI: CPT

## 2018-03-26 PROCEDURE — 77067 SCR MAMMO BI INCL CAD: CPT

## 2018-08-01 ENCOUNTER — TRANSCRIBE ORDERS (OUTPATIENT)
Dept: ADMINISTRATIVE | Facility: HOSPITAL | Age: 43
End: 2018-08-01

## 2018-08-01 DIAGNOSIS — R74.8 SERUM AMYLASE ELEVATED: Primary | ICD-10-CM

## 2018-08-06 ENCOUNTER — HOSPITAL ENCOUNTER (OUTPATIENT)
Dept: ULTRASOUND IMAGING | Facility: HOSPITAL | Age: 43
Discharge: HOME | End: 2018-08-06
Admitting: FAMILY MEDICINE

## 2018-08-06 DIAGNOSIS — R74.8 SERUM AMYLASE ELEVATED: ICD-10-CM

## 2018-08-06 PROCEDURE — 76705 ECHO EXAM OF ABDOMEN: CPT

## 2020-02-21 ENCOUNTER — TRANSCRIBE ORDERS (OUTPATIENT)
Dept: ADMINISTRATIVE | Facility: HOSPITAL | Age: 45
End: 2020-02-21

## 2020-02-21 DIAGNOSIS — M25.462 PAIN AND SWELLING OF LEFT KNEE: Primary | ICD-10-CM

## 2020-02-21 DIAGNOSIS — M25.562 PAIN AND SWELLING OF LEFT KNEE: Primary | ICD-10-CM

## 2020-02-26 ENCOUNTER — HOSPITAL ENCOUNTER (OUTPATIENT)
Dept: MRI IMAGING | Facility: HOSPITAL | Age: 45
Discharge: HOME | End: 2020-02-26
Admitting: NURSE PRACTITIONER

## 2020-02-26 DIAGNOSIS — M25.562 PAIN AND SWELLING OF LEFT KNEE: ICD-10-CM

## 2020-02-26 DIAGNOSIS — M25.462 PAIN AND SWELLING OF LEFT KNEE: ICD-10-CM

## 2020-02-26 LAB — CREAT BLDA-MCNC: 0.7 MG/DL (ref 0.6–1.3)

## 2020-02-26 PROCEDURE — 82565 ASSAY OF CREATININE: CPT

## 2020-02-26 PROCEDURE — 73721 MRI JNT OF LWR EXTRE W/O DYE: CPT

## 2020-03-03 ENCOUNTER — OFFICE VISIT (OUTPATIENT)
Dept: ORTHOPEDIC SURGERY | Facility: CLINIC | Age: 45
End: 2020-03-03

## 2020-03-03 VITALS
DIASTOLIC BLOOD PRESSURE: 82 MMHG | WEIGHT: 165 LBS | BODY MASS INDEX: 32.39 KG/M2 | HEART RATE: 105 BPM | HEIGHT: 60 IN | SYSTOLIC BLOOD PRESSURE: 134 MMHG

## 2020-03-03 DIAGNOSIS — R52 PAIN: Primary | ICD-10-CM

## 2020-03-03 DIAGNOSIS — M17.12 PRIMARY OSTEOARTHRITIS OF LEFT KNEE: ICD-10-CM

## 2020-03-03 DIAGNOSIS — S83.207A UNSPECIFIED TEAR OF UNSPECIFIED MENISCUS, CURRENT INJURY, LEFT KNEE, INITIAL ENCOUNTER: ICD-10-CM

## 2020-03-03 PROBLEM — S83.206A TEAR OF MENISCUS OF RIGHT KNEE AS CURRENT INJURY: Status: ACTIVE | Noted: 2020-03-03

## 2020-03-03 PROCEDURE — 73562 X-RAY EXAM OF KNEE 3: CPT | Performed by: NURSE PRACTITIONER

## 2020-03-03 PROCEDURE — 20610 DRAIN/INJ JOINT/BURSA W/O US: CPT | Performed by: NURSE PRACTITIONER

## 2020-03-03 PROCEDURE — 99203 OFFICE O/P NEW LOW 30 MIN: CPT | Performed by: NURSE PRACTITIONER

## 2020-03-03 RX ORDER — CELECOXIB 200 MG/1
CAPSULE ORAL
COMMUNITY
Start: 2020-02-24

## 2020-03-03 RX ORDER — GLIPIZIDE 10 MG/1
TABLET ORAL
COMMUNITY
Start: 2020-01-28

## 2020-03-03 RX ORDER — PROCHLORPERAZINE 25 MG/1
SUPPOSITORY RECTAL
COMMUNITY
Start: 2020-02-14

## 2020-03-03 RX ORDER — LIDOCAINE HYDROCHLORIDE 10 MG/ML
8 INJECTION, SOLUTION EPIDURAL; INFILTRATION; INTRACAUDAL; PERINEURAL
Status: COMPLETED | OUTPATIENT
Start: 2020-03-03 | End: 2020-03-03

## 2020-03-03 RX ORDER — INSULIN GLARGINE 100 [IU]/ML
INJECTION, SOLUTION SUBCUTANEOUS
COMMUNITY
Start: 2020-02-25 | End: 2020-10-21 | Stop reason: SDUPTHER

## 2020-03-03 RX ORDER — TRIAMCINOLONE ACETONIDE 40 MG/ML
80 INJECTION, SUSPENSION INTRA-ARTICULAR; INTRAMUSCULAR
Status: COMPLETED | OUTPATIENT
Start: 2020-03-03 | End: 2020-03-03

## 2020-03-03 RX ADMIN — LIDOCAINE HYDROCHLORIDE 8 ML: 10 INJECTION, SOLUTION EPIDURAL; INFILTRATION; INTRACAUDAL; PERINEURAL at 11:13

## 2020-03-03 RX ADMIN — TRIAMCINOLONE ACETONIDE 80 MG: 40 INJECTION, SUSPENSION INTRA-ARTICULAR; INTRAMUSCULAR at 11:13
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
## 2020-03-03 NOTE — PATIENT INSTRUCTIONS
Post-injection instructions    ? Apply ice to the injection side as needed to relieve pain, 10-15 minutes on and off every few hours.     ? Watch for signs and symptoms of infection, including significantly increased pain, redness, swelling, warmth to the touch, fevers, sweats, chills. If these symptoms occur, please notify our office immediately [phone].    ? Keep the injection site clean. You may remove adhesive bandage once bleeding has stopped.    ? Do not engage in any new or strenuous activities for at least the next 24 hours until soreness has stopped.    ? Some people will receive immediate relief with a steroid injection however it takes several days before the steroid starts working. You may receive 3-4 steroid injections a year, if necessary. If you get no relief with from the injection, we will continue to work with you to explore other treatment options.    ? It will take approximately four weeks after the last (or single) gel injection before you notice symptom relief.    * Some people experience redness or feeling of warmth after receiving corticosteroid injection. If you have diabetes, the corticosteroid injection may temporarily increase your blood sugar levels. Continue to check glucose levels and if not improving, contact your primary care provider for further treatment.   * Please keep mind that this is not a one-size-fits all approach. If you have questions or concerns, contact our office.

## 2020-04-06 ENCOUNTER — TELEMEDICINE (OUTPATIENT)
Dept: ORTHOPEDIC SURGERY | Facility: CLINIC | Age: 45
End: 2020-04-06

## 2020-04-06 DIAGNOSIS — M17.12 PRIMARY OSTEOARTHRITIS OF LEFT KNEE: Primary | ICD-10-CM

## 2020-04-06 PROBLEM — R07.89 OTHER CHEST PAIN: Status: RESOLVED | Noted: 2017-10-19 | Resolved: 2020-04-06

## 2020-04-06 PROCEDURE — 99213 OFFICE O/P EST LOW 20 MIN: CPT | Performed by: NURSE PRACTITIONER

## 2020-04-06 RX ORDER — MECLIZINE HYDROCHLORIDE 25 MG/1
TABLET ORAL
COMMUNITY
Start: 2020-04-01

## 2020-04-06 RX ORDER — LISINOPRIL 10 MG/1
10 TABLET ORAL DAILY
COMMUNITY
Start: 2020-03-24
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
## 2020-04-20 ENCOUNTER — CLINICAL SUPPORT (OUTPATIENT)
Dept: ORTHOPEDIC SURGERY | Facility: CLINIC | Age: 45
End: 2020-04-20

## 2020-04-20 VITALS — HEIGHT: 60 IN | WEIGHT: 165 LBS | BODY MASS INDEX: 32.39 KG/M2

## 2020-04-20 DIAGNOSIS — M17.12 PRIMARY OSTEOARTHRITIS OF LEFT KNEE: Primary | ICD-10-CM

## 2020-04-20 PROCEDURE — 20610 DRAIN/INJ JOINT/BURSA W/O US: CPT | Performed by: NURSE PRACTITIONER

## 2020-04-20 NOTE — PROGRESS NOTES
Procedure   Large Joint Arthrocentesis: L knee  Date/Time: 4/20/2020 2:16 PM  Consent given by: patient  Site marked: site marked  Timeout: Immediately prior to procedure a time out was called to verify the correct patient, procedure, equipment, support staff and site/side marked as required   Supporting Documentation  Indications: pain   Procedure Details  Location: knee - L knee  Preparation: Patient was prepped and draped in the usual sterile fashion  Needle size: 20 G  Approach: superior (LATERAL)  Medications administered: 30 mg Cross-Linked Hyaluronate 30 MG/3ML  Patient tolerance: patient tolerated the procedure well with no immediate complications      GEL ONE PROVIDED VIA [facility].    Patient presents to clinic today for left knee viscosupplement injection, single. I explained details of injections as well as risks, benefits and alternatives with the patient today, had all questions answered, wished to proceed with injections.  I will see patient back in 8 weeks for re-evaluation. Patient was instructed to watch for signs or symptoms of infection including redness, swelling, warmth to the touch, or significant increased pain and to contact our office immediately if any of these issues were noted.

## 2020-09-11 ENCOUNTER — OFFICE VISIT (OUTPATIENT)
Dept: ORTHOPEDIC SURGERY | Facility: CLINIC | Age: 45
End: 2020-09-11

## 2020-09-11 DIAGNOSIS — M17.12 PRIMARY OSTEOARTHRITIS OF LEFT KNEE: Primary | ICD-10-CM

## 2020-09-11 PROCEDURE — 99213 OFFICE O/P EST LOW 20 MIN: CPT | Performed by: NURSE PRACTITIONER
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
## 2020-10-21 ENCOUNTER — CLINICAL SUPPORT (OUTPATIENT)
Dept: ORTHOPEDIC SURGERY | Facility: CLINIC | Age: 45
End: 2020-10-21

## 2020-10-21 VITALS — HEIGHT: 60 IN | BODY MASS INDEX: 32.39 KG/M2 | WEIGHT: 165 LBS

## 2020-10-21 DIAGNOSIS — M17.12 PRIMARY OSTEOARTHRITIS OF LEFT KNEE: ICD-10-CM

## 2020-10-21 DIAGNOSIS — M25.562 ACUTE PAIN OF LEFT KNEE: Primary | ICD-10-CM

## 2020-10-21 PROCEDURE — 20610 DRAIN/INJ JOINT/BURSA W/O US: CPT | Performed by: NURSE PRACTITIONER

## 2020-10-21 RX ORDER — HYALURONATE SOD, CROSS-LINKED 30 MG/3 ML
SYRINGE (ML) INTRAARTICULAR
COMMUNITY
Start: 2020-10-09 | End: 2022-03-08

## 2020-10-21 RX ORDER — NEEDLES, DISPOSABLE 25GX5/8"
NEEDLE, DISPOSABLE MISCELLANEOUS SEE ADMIN INSTRUCTIONS
COMMUNITY
Start: 2020-10-09

## 2020-10-21 RX ORDER — INSULIN GLARGINE 100 [IU]/ML
INJECTION, SOLUTION SUBCUTANEOUS
COMMUNITY
Start: 2020-09-13 | End: 2022-03-08

## 2020-10-21 NOTE — PROGRESS NOTES
Procedure   Large Joint Arthrocentesis: L knee  Date/Time: 10/21/2020 8:31 AM  Consent given by: patient  Site marked: site marked  Timeout: Immediately prior to procedure a time out was called to verify the correct patient, procedure, equipment, support staff and site/side marked as required   Supporting Documentation  Indications: pain   Procedure Details  Location: knee - L knee  Preparation: Patient was prepped and draped in the usual sterile fashion  Needle size: 22 G  Approach: superior  Medications administered: 30 mg Cross-Linked Hyaluronate 30 MG/3ML  Patient tolerance: patient tolerated the procedure well with no immediate complications      Patient presents to clinic today for left knee viscosupplement injections.  This is the single injection of the series.  I explained details of injections as well as risks, benefits and alternatives with the patient today, had all questions answered, wished to proceed with injections.  I will see patient back in 6 weeks.  Patient was instructed to watch for signs or symptoms of infection including redness, swelling, warmth to the touch, or significant increased pain and to contact our office immediately if any of these issues were noted.

## 2022-03-08 ENCOUNTER — OFFICE VISIT (OUTPATIENT)
Dept: ORTHOPEDIC SURGERY | Facility: CLINIC | Age: 47
End: 2022-03-08

## 2022-03-08 VITALS — HEIGHT: 60 IN | BODY MASS INDEX: 32.39 KG/M2 | WEIGHT: 165 LBS

## 2022-03-08 DIAGNOSIS — M17.12 PRIMARY OSTEOARTHRITIS OF LEFT KNEE: Primary | ICD-10-CM

## 2022-03-08 PROCEDURE — 73562 X-RAY EXAM OF KNEE 3: CPT | Performed by: NURSE PRACTITIONER

## 2022-03-08 PROCEDURE — 99214 OFFICE O/P EST MOD 30 MIN: CPT | Performed by: NURSE PRACTITIONER

## 2022-03-08 RX ORDER — FAMOTIDINE 20 MG/1
20 TABLET, FILM COATED ORAL 2 TIMES DAILY
COMMUNITY
Start: 2022-01-18

## 2022-03-08 RX ORDER — PREDNISONE 1 MG/1
TABLET ORAL
Qty: 21 TABLET | Refills: 0 | Status: SHIPPED | OUTPATIENT
Start: 2022-03-08
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
## 2022-04-26 ENCOUNTER — CLINICAL SUPPORT (OUTPATIENT)
Dept: ORTHOPEDIC SURGERY | Facility: CLINIC | Age: 47
End: 2022-04-26

## 2022-04-26 VITALS — BODY MASS INDEX: 32.39 KG/M2 | HEIGHT: 60 IN | WEIGHT: 165 LBS

## 2022-04-26 DIAGNOSIS — M17.12 PRIMARY OSTEOARTHRITIS OF LEFT KNEE: Primary | ICD-10-CM

## 2022-04-26 PROCEDURE — 20610 DRAIN/INJ JOINT/BURSA W/O US: CPT | Performed by: NURSE PRACTITIONER

## 2022-04-26 NOTE — PROGRESS NOTES
Procedure   Large Joint Arthrocentesis: L knee  Date/Time: 4/26/2022 11:09 AM  Consent given by: patient  Site marked: site marked  Timeout: Immediately prior to procedure a time out was called to verify the correct patient, procedure, equipment, support staff and site/side marked as required   Supporting Documentation  Indications: pain   Procedure Details  Location: knee - L knee  Preparation: Patient was prepped and draped in the usual sterile fashion  Needle size: 22 G  Approach: superior  Medications administered: 25 mg sodium hyaluronate 25 MG/2.5ML  Patient tolerance: patient tolerated the procedure well with no immediate complications          Patient presents to clinic today for left knee viscosupplement injections.  This is the first injection of the series.  I explained details of injections as well as risks, benefits and alternatives with the patient today, had all questions answered, wished to proceed with injections.  I will see patient back in 1 week for repeat injection.  Patient was instructed to watch for signs or symptoms of infection including redness, swelling, warmth to the touch, or significant increased pain and to contact our office immediately if any of these issues were noted.

## 2022-05-03 ENCOUNTER — CLINICAL SUPPORT (OUTPATIENT)
Dept: ORTHOPEDIC SURGERY | Facility: CLINIC | Age: 47
End: 2022-05-03

## 2022-05-03 VITALS — BODY MASS INDEX: 32.39 KG/M2 | WEIGHT: 165 LBS | HEIGHT: 60 IN

## 2022-05-03 DIAGNOSIS — M17.12 PRIMARY OSTEOARTHRITIS OF LEFT KNEE: Primary | ICD-10-CM

## 2022-05-03 PROCEDURE — 20610 DRAIN/INJ JOINT/BURSA W/O US: CPT | Performed by: NURSE PRACTITIONER

## 2022-05-03 NOTE — PROGRESS NOTES
Large Joint Arthrocentesis: L knee  Date/Time: 5/3/2022 11:33 AM  Consent given by: patient  Site marked: site marked  Timeout: Immediately prior to procedure a time out was called to verify the correct patient, procedure, equipment, support staff and site/side marked as required   Supporting Documentation  Indications: pain   Procedure Details  Location: knee - L knee  Preparation: Patient was prepped and draped in the usual sterile fashion  Needle size: 20 G  Approach: superior  Medications administered: 25 mg sodium hyaluronate 25 MG/2.5ML  Patient tolerance: patient tolerated the procedure well with no immediate complications        Patient presents to clinic today for left knee viscosupplement injections.  This is the second injection of the series.  I explained details of injections as well as risks, benefits and alternatives with the patient today, had all questions answered, wished to proceed with injections.  I will see patient back in 1 week for repeat injection.  Patient was instructed to watch for signs or symptoms of infection including redness, swelling, warmth to the touch, or significant increased pain and to contact our office immediately if any of these issues were noted.

## 2022-05-10 ENCOUNTER — CLINICAL SUPPORT (OUTPATIENT)
Dept: ORTHOPEDIC SURGERY | Facility: CLINIC | Age: 47
End: 2022-05-10

## 2022-05-10 VITALS — BODY MASS INDEX: 32.39 KG/M2 | HEIGHT: 60 IN | WEIGHT: 165 LBS

## 2022-05-10 DIAGNOSIS — M17.12 PRIMARY OSTEOARTHRITIS OF LEFT KNEE: Primary | ICD-10-CM

## 2022-05-10 PROCEDURE — 20610 DRAIN/INJ JOINT/BURSA W/O US: CPT | Performed by: NURSE PRACTITIONER

## 2022-05-10 NOTE — PROGRESS NOTES
Procedure   Large Joint Arthrocentesis: L knee  Date/Time: 5/10/2022 11:48 AM  Consent given by: patient  Site marked: site marked  Timeout: Immediately prior to procedure a time out was called to verify the correct patient, procedure, equipment, support staff and site/side marked as required   Supporting Documentation  Indications: pain   Procedure Details  Location: knee - L knee  Preparation: Patient was prepped and draped in the usual sterile fashion  Needle size: 22 G  Approach: superior (lateral)  Medications administered: 25 mg sodium hyaluronate 25 MG/2.5ML  Patient tolerance: patient tolerated the procedure well with no immediate complications       LT KNEE VISCO-3 #3 PT PROVIDED VIA [facility].     Patient presents to clinic today for left knee viscosupplement injections.  This is the 3rd injection of the series.  I explained details of injections as well as risks, benefits and alternatives with the patient today, had all questions answered, wished to proceed with injections.  I will see patient back in 4 weeks, prn for re-evaluation. Patient was instructed to watch for signs or symptoms of infection including redness, swelling, warmth to the touch, or significant increased pain and to contact our office immediately if any of these issues were noted.

## 2022-06-28 ENCOUNTER — TELEPHONE (OUTPATIENT)
Dept: ORTHOPEDIC SURGERY | Facility: CLINIC | Age: 47
End: 2022-06-28

## 2024-04-30 ENCOUNTER — TRANSCRIBE ORDERS (OUTPATIENT)
Dept: ADMINISTRATIVE | Facility: HOSPITAL | Age: 49
End: 2024-04-30
Payer: COMMERCIAL

## 2024-04-30 DIAGNOSIS — R06.02 SOB (SHORTNESS OF BREATH): ICD-10-CM

## 2024-04-30 DIAGNOSIS — R07.9 CHEST PAIN, UNSPECIFIED TYPE: Primary | ICD-10-CM

## 2024-05-06 ENCOUNTER — HOSPITAL ENCOUNTER (OUTPATIENT)
Dept: CARDIOLOGY | Facility: HOSPITAL | Age: 49
Discharge: HOME | End: 2024-05-06
Admitting: NURSE PRACTITIONER
Payer: COMMERCIAL

## 2024-05-06 DIAGNOSIS — R06.02 SOB (SHORTNESS OF BREATH): ICD-10-CM

## 2024-05-06 DIAGNOSIS — R07.9 CHEST PAIN, UNSPECIFIED TYPE: ICD-10-CM

## 2024-05-06 LAB
BH CV STRESS BP STAGE 1: NORMAL
BH CV STRESS BP STAGE 2: NORMAL
BH CV STRESS DURATION MIN STAGE 1: 3
BH CV STRESS DURATION MIN STAGE 2: 3
BH CV STRESS DURATION SEC STAGE 1: 0
BH CV STRESS DURATION SEC STAGE 2: 0
BH CV STRESS GRADE STAGE 1: 10
BH CV STRESS GRADE STAGE 2: 12
BH CV STRESS HR STAGE 1: 143
BH CV STRESS HR STAGE 2: 148
BH CV STRESS METS STAGE 1: 4.6
BH CV STRESS METS STAGE 2: 7
BH CV STRESS PROTOCOL 1: NORMAL
BH CV STRESS RECOVERY BP: NORMAL MMHG
BH CV STRESS RECOVERY HR: 113 BPM
BH CV STRESS SPEED STAGE 1: 1.7
BH CV STRESS SPEED STAGE 2: 2.5
BH CV STRESS STAGE 1: 1
BH CV STRESS STAGE 2: 2
MAXIMAL PREDICTED HEART RATE: 170 BPM
PERCENT MAX PREDICTED HR: 88.24 %
STRESS BASELINE BP: NORMAL MMHG
STRESS BASELINE HR: 108 BPM
STRESS O2 SAT REST: 98 %
STRESS PERCENT HR: 104 %
STRESS POST ESTIMATED WORKLOAD: 7 METS
STRESS POST EXERCISE DUR MIN: 5 MIN
STRESS POST EXERCISE DUR SEC: 0 SEC
STRESS POST O2 SAT PEAK: 98 %
STRESS POST PEAK BP: NORMAL MMHG
STRESS POST PEAK HR: 150 BPM
STRESS TARGET HR: 145 BPM

## 2024-05-06 PROCEDURE — 93016 CV STRESS TEST SUPVJ ONLY: CPT | Performed by: INTERNAL MEDICINE

## 2024-05-06 PROCEDURE — 93017 CV STRESS TEST TRACING ONLY: CPT

## 2024-05-06 PROCEDURE — 93018 CV STRESS TEST I&R ONLY: CPT | Performed by: INTERNAL MEDICINE

## 2024-10-14 ENCOUNTER — OFFICE VISIT (OUTPATIENT)
Dept: FAMILY MEDICINE CLINIC | Facility: CLINIC | Age: 49
End: 2024-10-14
Payer: COMMERCIAL

## 2024-10-14 ENCOUNTER — PATIENT ROUNDING (BHMG ONLY) (OUTPATIENT)
Dept: FAMILY MEDICINE CLINIC | Facility: CLINIC | Age: 49
End: 2024-10-14
Payer: COMMERCIAL

## 2024-10-14 VITALS
RESPIRATION RATE: 20 BRPM | OXYGEN SATURATION: 98 % | SYSTOLIC BLOOD PRESSURE: 156 MMHG | BODY MASS INDEX: 35.93 KG/M2 | HEIGHT: 60 IN | DIASTOLIC BLOOD PRESSURE: 90 MMHG | HEART RATE: 100 BPM | WEIGHT: 183 LBS

## 2024-10-14 DIAGNOSIS — I10 PRIMARY HYPERTENSION: ICD-10-CM

## 2024-10-14 DIAGNOSIS — J32.4 CHRONIC PANSINUSITIS: ICD-10-CM

## 2024-10-14 DIAGNOSIS — R63.5 WEIGHT GAIN: ICD-10-CM

## 2024-10-14 DIAGNOSIS — Z79.4 TYPE 2 DIABETES MELLITUS WITH HYPERGLYCEMIA, WITH LONG-TERM CURRENT USE OF INSULIN: Primary | ICD-10-CM

## 2024-10-14 DIAGNOSIS — E11.65 TYPE 2 DIABETES MELLITUS WITH HYPERGLYCEMIA, WITH LONG-TERM CURRENT USE OF INSULIN: Primary | ICD-10-CM

## 2024-10-14 DIAGNOSIS — R53.83 FATIGUE, UNSPECIFIED TYPE: ICD-10-CM

## 2024-10-14 DIAGNOSIS — E78.2 MIXED HYPERLIPIDEMIA: ICD-10-CM

## 2024-10-14 PROCEDURE — 99204 OFFICE O/P NEW MOD 45 MIN: CPT | Performed by: STUDENT IN AN ORGANIZED HEALTH CARE EDUCATION/TRAINING PROGRAM

## 2024-10-14 RX ORDER — PROCHLORPERAZINE 25 MG/1
SUPPOSITORY RECTAL
COMMUNITY
Start: 2024-09-12

## 2024-10-14 RX ORDER — LISINOPRIL 10 MG/1
10 TABLET ORAL DAILY
Qty: 90 TABLET | Refills: 2 | Status: SHIPPED | OUTPATIENT
Start: 2024-10-14

## 2024-10-14 RX ORDER — ALBUTEROL SULFATE 90 UG/1
2 INHALANT RESPIRATORY (INHALATION) EVERY 4 HOURS PRN
COMMUNITY
Start: 2024-09-12

## 2024-10-14 RX ORDER — PSEUDOEPHEDRINE HYDROCHLORIDE 120 MG/1
120 TABLET, FILM COATED, EXTENDED RELEASE ORAL EVERY 12 HOURS
COMMUNITY
End: 2024-10-14 | Stop reason: SDUPTHER

## 2024-10-14 RX ORDER — MULTIPLE VITAMINS W/ MINERALS TAB 9MG-400MCG
1 TAB ORAL DAILY
COMMUNITY

## 2024-10-14 RX ORDER — PSEUDOEPHEDRINE HYDROCHLORIDE 120 MG/1
120 TABLET, FILM COATED, EXTENDED RELEASE ORAL EVERY 12 HOURS
Qty: 30 TABLET | Refills: 2 | Status: SHIPPED | OUTPATIENT
Start: 2024-10-14

## 2024-10-14 RX ORDER — PROCHLORPERAZINE 25 MG/1
SUPPOSITORY RECTAL
COMMUNITY
Start: 2024-09-30

## 2024-10-14 RX ORDER — GLIPIZIDE 10 MG/1
10 TABLET ORAL
Qty: 60 TABLET | Refills: 2 | Status: SHIPPED | OUTPATIENT
Start: 2024-10-14

## 2024-10-14 NOTE — PROGRESS NOTES
Venipuncture Blood Specimen Collection  Venipuncture performed in left arm by Edilma Santana MA with good hemostasis. Patient tolerated the procedure well without complications.   10/14/24   Edilma Santana MA

## 2024-10-14 NOTE — PROGRESS NOTES
A [employer] message has been sent to the patient [place] [facility] [place]

## 2024-10-15 LAB
ALBUMIN SERPL-MCNC: 4.4 G/DL (ref 3.5–5.2)
ALBUMIN/GLOB SERPL: 1.5 G/DL
ALP SERPL-CCNC: 66 U/L (ref 39–117)
ALT SERPL-CCNC: 38 U/L (ref 1–33)
AST SERPL-CCNC: 25 U/L (ref 1–32)
BILIRUB SERPL-MCNC: <0.2 MG/DL (ref 0–1.2)
BUN SERPL-MCNC: 23 MG/DL (ref 6–20)
BUN/CREAT SERPL: 21.7 (ref 7–25)
CALCIUM SERPL-MCNC: 9.6 MG/DL (ref 8.6–10.5)
CHLORIDE SERPL-SCNC: 102 MMOL/L (ref 98–107)
CHOLEST SERPL-MCNC: 204 MG/DL (ref 0–200)
CO2 SERPL-SCNC: 22.5 MMOL/L (ref 22–29)
CREAT SERPL-MCNC: 1.06 MG/DL (ref 0.57–1)
EGFRCR SERPLBLD CKD-EPI 2021: 63.7 ML/MIN/1.73
GLOBULIN SER CALC-MCNC: 3 GM/DL
GLUCOSE SERPL-MCNC: 136 MG/DL (ref 65–99)
HBA1C MFR BLD: 7.2 % (ref 4.8–5.6)
HDLC SERPL-MCNC: 32 MG/DL (ref 40–60)
LDLC SERPL CALC-MCNC: 119 MG/DL (ref 0–100)
MICROALBUMIN UR-MCNC: 424.8 UG/ML
POTASSIUM SERPL-SCNC: 4.6 MMOL/L (ref 3.5–5.2)
PROT SERPL-MCNC: 7.4 G/DL (ref 6–8.5)
SODIUM SERPL-SCNC: 137 MMOL/L (ref 136–145)
T3FREE SERPL-MCNC: 3.5 PG/ML (ref 2–4.4)
T4 FREE SERPL-MCNC: 1.31 NG/DL (ref 0.92–1.68)
TRIGL SERPL-MCNC: 300 MG/DL (ref 0–150)
TSH SERPL DL<=0.005 MIU/L-ACNC: 1.39 UIU/ML (ref 0.27–4.2)
VLDLC SERPL CALC-MCNC: 53 MG/DL (ref 5–40)
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
## 2024-11-05 DIAGNOSIS — E11.65 TYPE 2 DIABETES MELLITUS WITH HYPERGLYCEMIA, WITH LONG-TERM CURRENT USE OF INSULIN: ICD-10-CM

## 2024-11-05 DIAGNOSIS — K21.9 GASTROESOPHAGEAL REFLUX DISEASE WITHOUT ESOPHAGITIS: Primary | ICD-10-CM

## 2024-11-05 DIAGNOSIS — I10 PRIMARY HYPERTENSION: ICD-10-CM

## 2024-11-05 DIAGNOSIS — Z79.4 TYPE 2 DIABETES MELLITUS WITH HYPERGLYCEMIA, WITH LONG-TERM CURRENT USE OF INSULIN: ICD-10-CM

## 2024-11-07 RX ORDER — LISINOPRIL 10 MG/1
10 TABLET ORAL DAILY
Qty: 90 TABLET | Refills: 2 | Status: SHIPPED | OUTPATIENT
Start: 2024-11-07

## 2024-11-07 RX ORDER — PEN NEEDLE, DIABETIC 29 G X1/2"
1 NEEDLE, DISPOSABLE MISCELLANEOUS
Qty: 100 EACH | Refills: 5 | Status: SHIPPED | OUTPATIENT
Start: 2024-11-07

## 2024-11-07 RX ORDER — FAMOTIDINE 20 MG/1
20 TABLET, FILM COATED ORAL 2 TIMES DAILY
Qty: 180 TABLET | Refills: 1 | Status: SHIPPED | OUTPATIENT
Start: 2024-11-07

## 2024-11-07 RX ORDER — HYDROCHLOROTHIAZIDE 25 MG/1
25 TABLET ORAL DAILY
Qty: 90 TABLET | Refills: 1 | Status: SHIPPED | OUTPATIENT
Start: 2024-11-07

## 2024-11-07 RX ORDER — PANTOPRAZOLE SODIUM 40 MG/1
40 TABLET, DELAYED RELEASE ORAL DAILY
Qty: 30 TABLET | Refills: 2 | Status: SHIPPED | OUTPATIENT
Start: 2024-11-07

## 2024-11-07 RX ORDER — PROCHLORPERAZINE 25 MG/1
SUPPOSITORY RECTAL
Qty: 3 EACH | Refills: 10 | Status: SHIPPED | OUTPATIENT
Start: 2024-11-07

## 2024-11-17 DIAGNOSIS — Z79.4 TYPE 2 DIABETES MELLITUS WITH HYPERGLYCEMIA, WITH LONG-TERM CURRENT USE OF INSULIN: ICD-10-CM

## 2024-11-17 DIAGNOSIS — E11.65 TYPE 2 DIABETES MELLITUS WITH HYPERGLYCEMIA, WITH LONG-TERM CURRENT USE OF INSULIN: ICD-10-CM

## 2024-11-19 RX ORDER — PROCHLORPERAZINE 25 MG/1
SUPPOSITORY RECTAL
Qty: 3 EACH | Refills: 10 | OUTPATIENT
Start: 2024-11-19

## 2024-11-20 DIAGNOSIS — Z12.31 ENCOUNTER FOR SCREENING MAMMOGRAM FOR BREAST CANCER: Primary | ICD-10-CM

## 2024-11-21 RX ORDER — MECLIZINE HYDROCHLORIDE 25 MG/1
25 TABLET ORAL AS NEEDED
Qty: 21 TABLET | Refills: 0 | Status: SHIPPED | OUTPATIENT
Start: 2024-11-21

## 2024-12-06 DIAGNOSIS — Z79.4 TYPE 2 DIABETES MELLITUS WITH HYPERGLYCEMIA, WITH LONG-TERM CURRENT USE OF INSULIN: ICD-10-CM

## 2024-12-06 DIAGNOSIS — E11.65 TYPE 2 DIABETES MELLITUS WITH HYPERGLYCEMIA, WITH LONG-TERM CURRENT USE OF INSULIN: ICD-10-CM

## 2025-01-04 DIAGNOSIS — Z79.4 TYPE 2 DIABETES MELLITUS WITH HYPERGLYCEMIA, WITH LONG-TERM CURRENT USE OF INSULIN: ICD-10-CM

## 2025-01-04 DIAGNOSIS — E11.65 TYPE 2 DIABETES MELLITUS WITH HYPERGLYCEMIA, WITH LONG-TERM CURRENT USE OF INSULIN: ICD-10-CM

## 2025-01-06 NOTE — TELEPHONE ENCOUNTER
Rx Refill Note  Requested Prescriptions     Pending Prescriptions Disp Refills    Tirzepatide 7.5 MG/0.5ML solution auto-injector 2 mL 2     Sig: Inject 7.5 mg under the skin into the appropriate area as directed 1 (One) Time Per Week.      Last office visit with prescribing clinician: 10/14/2024   Last telemedicine visit with prescribing clinician: Visit date not found   Next office visit with prescribing clinician: 1/14/2025     Patient comment: I need the next higher dose, 10.0, please.

## 2025-01-14 ENCOUNTER — OFFICE VISIT (OUTPATIENT)
Dept: FAMILY MEDICINE CLINIC | Facility: CLINIC | Age: 50
End: 2025-01-14
Payer: COMMERCIAL

## 2025-01-14 VITALS
DIASTOLIC BLOOD PRESSURE: 64 MMHG | BODY MASS INDEX: 31.41 KG/M2 | RESPIRATION RATE: 20 BRPM | WEIGHT: 160 LBS | SYSTOLIC BLOOD PRESSURE: 120 MMHG | HEIGHT: 60 IN

## 2025-01-14 DIAGNOSIS — I10 PRIMARY HYPERTENSION: ICD-10-CM

## 2025-01-14 DIAGNOSIS — D36.9 DERMOID CYST: ICD-10-CM

## 2025-01-14 DIAGNOSIS — E11.65 TYPE 2 DIABETES MELLITUS WITH HYPERGLYCEMIA, WITH LONG-TERM CURRENT USE OF INSULIN: Primary | ICD-10-CM

## 2025-01-14 DIAGNOSIS — E78.2 MIXED HYPERLIPIDEMIA: ICD-10-CM

## 2025-01-14 DIAGNOSIS — Z79.4 TYPE 2 DIABETES MELLITUS WITH HYPERGLYCEMIA, WITH LONG-TERM CURRENT USE OF INSULIN: Primary | ICD-10-CM

## 2025-01-14 PROCEDURE — 99214 OFFICE O/P EST MOD 30 MIN: CPT | Performed by: STUDENT IN AN ORGANIZED HEALTH CARE EDUCATION/TRAINING PROGRAM

## 2025-01-14 NOTE — PROGRESS NOTES
Ramos Narvaez DO  [facility]  [address]  [place] [id number] [phone]    Subjective      Name Jenni Lopez MRN [id number]    1975 AGE/SEX 49 y.o. / female      Chief Complaint Chief Complaint   Patient presents with    Diabetes    Cyst     Has a small cyst on scalp on the right side of head          Visit History for  2025    Jenni Lopez is a 49 y.o. female who presented today for Diabetes and Cyst (Has a small cyst on scalp on the right side of head )       History of Present Illness    She has experienced significant weight loss, with her weight decreasing from 169 to 152 pounds. . She has since increased her physical activity to regain muscle mass, resulting in a current weight of 160 pounds. Her appetite has decreased, often feeling satiated after consuming only a few bites of food. She reports no cravings for sweets. She has been on medical leave for the past 3 weeks, during which she has focused on strengthening her arms and legs. She reports an improvement in her back pain and has reduced her bra size by 2 sizes. She also reports a reduction in hip and knee pain. She has been consuming duuin milk and protein bars and has been preparing her own snack mix. She has been eating cheese, meats, wheat thins, crackers, and nuts.    Her blood glucose levels have been well-controlled, rarely exceeding 130. She has reduced her insulin dosage to 45 units in the morning and occasionally administers an additional 5 to 10 units at night if she anticipates consuming more food. Her average fasting blood glucose level is currently 75. She has discontinued Januvia due to adverse reactions and has also stopped taking glipizide. She has reduced her metformin dosage from 2 tablets in the morning and evening to 1 tablet in the morning. She has been using Kaopectate peppermint flavor to manage nausea and sulfur burps, which she reports as effective. She  "experiences morning bloating, which is alleviated by burping after drinking coffee. Her bowel movements are regular, although not as consistent as before.    She has been taking lisinopril for kidney protection due to a family history of dialysis in both parents. She monitors her blood pressure at home and reports that it is typically not as low as it was during this visit. She attributes the low reading to not having eaten or drunk much today.    She has a recurrent cyst on her head that appears approximately every 5 years. The cyst has become painful again. She recalls an incident 5 years ago when she accidentally scraped the cyst, leading to an infection characterized by red streaks extending down her head. She is seeking advice on whether the cyst needs to be drained, as she is reluctant to do so herself. She was previously treated with antibiotics for 3 weeks due to the severity of the infection. The cyst has been present for about 5 years and has significantly enlarged over the past 6 months. She believes that 1 or 2 stitches were applied during the last treatment.         Medications and Allergies   Current Outpatient Medications   Medication Instructions    albuterol sulfate  (90 Base) MCG/ACT inhaler 2 puffs, Every 4 Hours PRN    BD Disp Needles 20G X 1-1/2\" misc See Admin Instructions    BD ULTRA-FINE PEN NEEDLES 29G X 12.7MM misc 1 each, Not Applicable, 4 Times Daily After Meals & at Bedtime    celecoxib (CeleBREX) 200 MG capsule TK 1 C PO D    Cinnamon 1,000 mg, Daily    Continuous Blood Gluc  (DEXCOM G6 ) device 1 EACH MC ONE    Continuous Glucose Sensor (Dexcom G6 Sensor) Not Applicable, Every 10 Days    Continuous Glucose Transmitter (Dexcom G6 Transmitter) misc     famotidine (PEPCID) 20 mg, Oral, 2 Times Daily    hydroCHLOROthiazide 25 mg, Oral, Daily    Insulin Glargine (LANTUS SOLOSTAR) 100 UNIT/ML injection pen ADMINISTER 70 UNITS UNDER THE SKIN DAILY    lisinopril " "(PRINIVIL,ZESTRIL) 10 mg, Oral, Daily    meclizine (ANTIVERT) 25 mg, Oral, As Needed, PRN    metFORMIN (GLUCOPHAGE) 1,000 mg, 2 Times Daily With Meals    multivitamin with minerals tablet tablet 1 tablet, Daily    pantoprazole (PROTONIX) 40 mg, Oral, Daily    Suphedrine 12Hour 120 mg, Oral, Every 12 Hours    Tirzepatide 10 mg, Subcutaneous, Weekly     Allergies   Allergen Reactions    Erythromycin Hives, Swelling and Unknown - High Severity      I have reviewed the above medications and allergies     Objective:      Vitals Vitals:    01/14/25 1343   BP: 120/64   BP Location: Left arm   Patient Position: Sitting   Cuff Size: Adult   Resp: 20   Weight: 72.6 kg (160 lb)   Height: 152.4 cm (60\")     Body mass index is 31.25 kg/m².    Physical Exam  Vitals reviewed.   Constitutional:       General: She is not in acute distress.     Appearance: She is not ill-appearing.   Pulmonary:      Effort: Pulmonary effort is normal.   Psychiatric:         Mood and Affect: Mood normal.         Behavior: Behavior normal.         Thought Content: Thought content normal.         Judgment: Judgment normal.          Physical Exam       Results  Laboratory Studies  Average fasting blood glucose is 75.     Assessment/Plan   Issues Addressed/ Plan   Diagnosis Plan   1. Type 2 diabetes mellitus with hyperglycemia, with long-term current use of insulin  Hemoglobin A1c    Comprehensive Metabolic Panel      2. Primary hypertension  Hemoglobin A1c    Comprehensive Metabolic Panel      3. Mixed hyperlipidemia  Lipid Panel      4. Dermoid cyst  Ambulatory Referral to General Surgery         Assessment & Plan  Diabetes Mellitus.  Her blood glucose levels have been well-controlled, rarely exceeding 130. She has reduced her insulin dosage to 45 units in the morning and occasionally administers an additional 5 to 10 units at night if she anticipates consuming more food. Her average fasting blood glucose level is currently 75. She has discontinued " Boyuvia due to adverse reactions and has also stopped taking glipizide. She has reduced her metformin dosage from 2 tablets in the morning and evening to 1 tablet in the morning. She is advised to monitor her fasting blood glucose levels closely. If her fasting levels remain below 150, she can reduce her insulin dosage by 2 units until her levels increase to 150. This will help maintain her A1c around 7 and prevent hypoglycemia. She is encouraged to continue using her continuous glucose monitor and to avoid spiking over 150.    She is advised to maintain a consistent exercise regimen to preserve muscle tone.  She is also advised to monitor her blood pressure at home and to reduce or discontinue hydrochlorothiazide if she experiences lightheadedness upon standing or bending over. She is advised to continue Mounjaro for at least one year to establish healthy eating habits.    Hypertension.  Her blood pressure readings are within normal limits. She is advised to monitor her blood pressure at home and to reduce or discontinue hydrochlorothiazide if she experiences lightheadedness upon standing or bending over. She is also advised to continue taking lisinopril for kidney protection, given her family history of kidney disease.    Cyst on the head.  The cyst is likely a dermoid cyst. She is advised to have the cyst incised and drained by a surgeon. A referral to a surgeon will be made for an outpatient procedure.    Follow-up  The patient will follow up in 3 months.           There are no Patient Instructions on file for this visit.   Follow up  recommended Return in about 3 months (around 4/14/2025) for Diabetes.   - Dragon voice recognition software was utilized to complete this chart.  Every reasonable attempt was made to edit and correct the text, however some incorrect words may remain.   Ramos Narvaez DO    Patient or patient representative verbalized consent for the use of Ambient Listening during the visit with   Ramos Narvaez DO for chart documentation. 1/26/2025  21:27 EST

## 2025-01-15 LAB
ALBUMIN SERPL-MCNC: 4.4 G/DL (ref 3.8–4.9)
ALP SERPL-CCNC: 77 IU/L (ref 44–121)
ALT SERPL-CCNC: 24 IU/L (ref 0–32)
AST SERPL-CCNC: 20 IU/L (ref 0–40)
BILIRUB SERPL-MCNC: <0.2 MG/DL (ref 0–1.2)
BUN SERPL-MCNC: 15 MG/DL (ref 6–24)
BUN/CREAT SERPL: 18 (ref 9–23)
CALCIUM SERPL-MCNC: 9.4 MG/DL (ref 8.7–10.2)
CHLORIDE SERPL-SCNC: 104 MMOL/L (ref 96–106)
CHOLEST SERPL-MCNC: 149 MG/DL (ref 100–199)
CO2 SERPL-SCNC: 23 MMOL/L (ref 20–29)
CREAT SERPL-MCNC: 0.83 MG/DL (ref 0.57–1)
EGFRCR SERPLBLD CKD-EPI 2021: 85 ML/MIN/1.73
GLOBULIN SER CALC-MCNC: 2.7 G/DL (ref 1.5–4.5)
GLUCOSE SERPL-MCNC: 70 MG/DL (ref 70–99)
HBA1C MFR BLD: 6.3 % (ref 4.8–5.6)
HDLC SERPL-MCNC: 27 MG/DL
LDLC SERPL CALC-MCNC: 89 MG/DL (ref 0–99)
POTASSIUM SERPL-SCNC: 4.2 MMOL/L (ref 3.5–5.2)
PROT SERPL-MCNC: 7.1 G/DL (ref 6–8.5)
SODIUM SERPL-SCNC: 141 MMOL/L (ref 134–144)
TRIGL SERPL-MCNC: 190 MG/DL (ref 0–149)
VLDLC SERPL CALC-MCNC: 33 MG/DL (ref 5–40)

## 2025-01-26 DIAGNOSIS — E11.65 TYPE 2 DIABETES MELLITUS WITH HYPERGLYCEMIA, WITH LONG-TERM CURRENT USE OF INSULIN: ICD-10-CM

## 2025-01-26 DIAGNOSIS — Z79.4 TYPE 2 DIABETES MELLITUS WITH HYPERGLYCEMIA, WITH LONG-TERM CURRENT USE OF INSULIN: ICD-10-CM

## 2025-01-26 DIAGNOSIS — J32.4 CHRONIC PANSINUSITIS: ICD-10-CM

## 2025-01-27 NOTE — TELEPHONE ENCOUNTER
Rx Refill Note  Requested Prescriptions     Pending Prescriptions Disp Refills    metFORMIN (GLUCOPHAGE) 1000 MG tablet       Sig: Take 1 tablet by mouth 2 (Two) Times a Day With Meals.    Continuous Glucose Transmitter (Dexcom G6 Transmitter) misc      Suphedrine 12Hour 120 MG 12 hr tablet 30 tablet 2     Sig: Take 1 tablet by mouth Every 12 (Twelve) Hours.    Continuous Glucose Sensor (Dexcom G6 Sensor) 3 each 10     Sig: Use Every 10 (Ten) Days.      Last office visit with prescribing clinician: 1/14/2025   Last telemedicine visit with prescribing clinician: Visit date not found   Next office visit with prescribing clinician: 4/14/2025

## 2025-01-29 RX ORDER — PROCHLORPERAZINE 25 MG/1
1 SUPPOSITORY RECTAL
Qty: 3 EACH | Refills: 1 | Status: SHIPPED | OUTPATIENT
Start: 2025-01-29

## 2025-01-29 RX ORDER — PROCHLORPERAZINE 25 MG/1
SUPPOSITORY RECTAL
Qty: 3 EACH | Refills: 10 | Status: SHIPPED | OUTPATIENT
Start: 2025-01-29

## 2025-01-29 RX ORDER — PSEUDOEPHEDRINE HYDROCHLORIDE 120 MG/1
120 TABLET, FILM COATED, EXTENDED RELEASE ORAL EVERY 12 HOURS
Qty: 30 TABLET | Refills: 2 | Status: SHIPPED | OUTPATIENT
Start: 2025-01-29

## 2025-02-04 ENCOUNTER — OFFICE VISIT (OUTPATIENT)
Dept: SURGERY | Facility: CLINIC | Age: 50
End: 2025-02-04
Payer: COMMERCIAL

## 2025-02-04 VITALS
DIASTOLIC BLOOD PRESSURE: 68 MMHG | BODY MASS INDEX: 29.25 KG/M2 | WEIGHT: 149 LBS | HEIGHT: 60 IN | SYSTOLIC BLOOD PRESSURE: 128 MMHG | HEART RATE: 95 BPM | OXYGEN SATURATION: 98 %

## 2025-02-04 DIAGNOSIS — L72.9 SCALP CYST: Primary | ICD-10-CM

## 2025-02-04 PROCEDURE — 88304 TISSUE EXAM BY PATHOLOGIST: CPT | Performed by: STUDENT IN AN ORGANIZED HEALTH CARE EDUCATION/TRAINING PROGRAM
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
## 2025-02-05 ENCOUNTER — PATIENT ROUNDING (BHMG ONLY) (OUTPATIENT)
Dept: SURGERY | Facility: CLINIC | Age: 50
End: 2025-02-05
Payer: COMMERCIAL

## 2025-02-05 DIAGNOSIS — Z79.4 TYPE 2 DIABETES MELLITUS WITH HYPERGLYCEMIA, WITH LONG-TERM CURRENT USE OF INSULIN: ICD-10-CM

## 2025-02-05 DIAGNOSIS — E11.65 TYPE 2 DIABETES MELLITUS WITH HYPERGLYCEMIA, WITH LONG-TERM CURRENT USE OF INSULIN: ICD-10-CM

## 2025-02-05 RX ORDER — GLIPIZIDE 10 MG/1
10 TABLET ORAL
Qty: 60 TABLET | Refills: 1 | OUTPATIENT
Start: 2025-02-05

## 2025-02-05 NOTE — PROGRESS NOTES
February 5, 2025        I am  with [facility]  [address]  [facility] [place] [address] [phone].      I am calling to officially welcome you to our practice and ask about your recent visit. Is this a good time to talk? No. Left voicemail to call back.

## 2025-02-06 LAB
CYTO UR: NORMAL
LAB AP CASE REPORT: NORMAL
PATH REPORT.FINAL DX SPEC: NORMAL
PATH REPORT.GROSS SPEC: NORMAL

## 2025-02-12 NOTE — TELEPHONE ENCOUNTER
Rx Refill Note  Requested Prescriptions     Pending Prescriptions Disp Refills    celecoxib (CeleBREX) 200 MG capsule        Last office visit with prescribing clinician: 1/14/2025   Last telemedicine visit with prescribing clinician: Visit date not found   Next office visit with prescribing clinician: 4/14/2025

## 2025-02-14 RX ORDER — CELECOXIB 200 MG/1
200 CAPSULE ORAL DAILY
Qty: 30 CAPSULE | Refills: 2 | Status: SHIPPED | OUTPATIENT
Start: 2025-02-14

## 2025-03-28 DIAGNOSIS — Z79.4 TYPE 2 DIABETES MELLITUS WITH HYPERGLYCEMIA, WITH LONG-TERM CURRENT USE OF INSULIN: ICD-10-CM

## 2025-03-28 DIAGNOSIS — E11.65 TYPE 2 DIABETES MELLITUS WITH HYPERGLYCEMIA, WITH LONG-TERM CURRENT USE OF INSULIN: ICD-10-CM

## 2025-03-31 DIAGNOSIS — Z79.4 TYPE 2 DIABETES MELLITUS WITH HYPERGLYCEMIA, WITH LONG-TERM CURRENT USE OF INSULIN: ICD-10-CM

## 2025-03-31 DIAGNOSIS — E11.65 TYPE 2 DIABETES MELLITUS WITH HYPERGLYCEMIA, WITH LONG-TERM CURRENT USE OF INSULIN: ICD-10-CM

## 2025-03-31 RX ORDER — PROCHLORPERAZINE 25 MG/1
SUPPOSITORY RECTAL
Qty: 3 EACH | Refills: 10 | Status: SHIPPED | OUTPATIENT
Start: 2025-03-31

## 2025-04-14 ENCOUNTER — OFFICE VISIT (OUTPATIENT)
Dept: FAMILY MEDICINE CLINIC | Facility: CLINIC | Age: 50
End: 2025-04-14
Payer: COMMERCIAL

## 2025-04-14 VITALS
SYSTOLIC BLOOD PRESSURE: 116 MMHG | WEIGHT: 144.4 LBS | HEART RATE: 90 BPM | DIASTOLIC BLOOD PRESSURE: 62 MMHG | RESPIRATION RATE: 14 BRPM | BODY MASS INDEX: 28.35 KG/M2 | HEIGHT: 60 IN | OXYGEN SATURATION: 99 %

## 2025-04-14 DIAGNOSIS — Z79.4 TYPE 2 DIABETES MELLITUS WITH HYPERGLYCEMIA, WITH LONG-TERM CURRENT USE OF INSULIN: Primary | ICD-10-CM

## 2025-04-14 DIAGNOSIS — E78.2 MIXED HYPERLIPIDEMIA: ICD-10-CM

## 2025-04-14 DIAGNOSIS — D64.9 ANEMIA, UNSPECIFIED TYPE: ICD-10-CM

## 2025-04-14 DIAGNOSIS — E11.65 TYPE 2 DIABETES MELLITUS WITH HYPERGLYCEMIA, WITH LONG-TERM CURRENT USE OF INSULIN: Primary | ICD-10-CM

## 2025-04-14 DIAGNOSIS — I10 PRIMARY HYPERTENSION: ICD-10-CM

## 2025-04-14 NOTE — PROGRESS NOTES
Ramos Narvaez,   [facility]  [address]  [place] [address]  [phone]    Subjective      Name Jenni Lopez MRN [id number]    1975 AGE/SEX 49 y.o. / female      Chief Complaint Chief Complaint   Patient presents with    Diabetes     3 month check up          Visit History for  2025    Jenni Lopez is a 49 y.o. female who presented today for Diabetes (3 month check up )       History of Present Illness  The patient presents for evaluation of diabetes mellitus, hypertension, and ADHD.    She has been experiencing weight loss, with her current weight recorded at 144 pounds. She reports a general sense of well-being and maintains an active lifestyle, incorporating light weightlifting and dance into her routine. She has also started using a Pilates bar and resistance bands, alternating between 5 and 10-pound weights. She has recently begun wall Pilates exercises, which she finds beneficial for leg strengthening. She has noticed a significant weight loss in her legs, which were previously well-toned. Her morning fasting blood glucose levels typically range between 125 and 150, never exceeding the latter. She has been gradually reducing her insulin dosage by 2 units each time, currently administering 22 units in the morning, a decrease from the previous 30 units. Her diet has changed, with a reduced tolerance for spicy foods and smaller portion sizes. Despite these changes, she reports no loss of energy. She administers her insulin at 6:45 AM before leaving the house but delays taking her other medications until lunchtime. She does not consume breakfast and only eats a quarter of her lunch, ensuring she has something in her stomach to prevent gastrointestinal upset from metformin. Her next meal is dinner, consumed around 7 PM, after which she feels satiated. She is on Mounjaro and metformin.    She has been gradually reducing her hydrochlorothiazide  "dosage to every other day, as per previous medical advice, while maintaining stable blood pressure readings. She has a family history of dialysis in both parents. She maintains adequate hydration throughout the day.    She has a history of ADHD and requires constant stimulation to prevent restlessness. She finds it difficult to sit still and needs to be engaged in activities until she feels sleepy. If she wakes up during the night, she needs to watch TV until she starts dozing off again.           Medications and Allergies   Current Outpatient Medications   Medication Instructions    albuterol sulfate  (90 Base) MCG/ACT inhaler 2 puffs, Every 4 Hours PRN    BD Disp Needles 20G X 1-1/2\" misc See Admin Instructions    BD ULTRA-FINE PEN NEEDLES 29G X 12.7MM misc 1 each, Not Applicable, 4 Times Daily After Meals & at Bedtime    celecoxib (CELEBREX) 200 mg, Oral, Daily    Cinnamon 1,000 mg, Daily    Continuous Blood Gluc  (DEXCOM G6 ) device 1 EACH MC ONE    Continuous Glucose Sensor (Dexcom G6 Sensor) Not Applicable, Every 10 Days    Continuous Glucose Transmitter (Dexcom G6 Transmitter) misc 1 each, Not Applicable, Every 3 Months    famotidine (PEPCID) 20 mg, Oral, 2 Times Daily    Insulin Glargine (LANTUS SOLOSTAR) 100 UNIT/ML injection pen ADMINISTER 70 UNITS UNDER THE SKIN DAILY    lisinopril (PRINIVIL,ZESTRIL) 10 mg, Oral, Daily    meclizine (ANTIVERT) 25 mg, Oral, As Needed, PRN    metFORMIN (GLUCOPHAGE) 1,000 mg, Oral, 2 Times Daily With Meals    multivitamin with minerals tablet tablet 1 tablet, Daily    pantoprazole (PROTONIX) 40 mg, Oral, Daily    Suphedrine 12Hour 120 mg, Oral, Every 12 Hours    Tirzepatide 10 mg, Subcutaneous, Weekly     Allergies   Allergen Reactions    Erythromycin Hives, Swelling and Unknown - High Severity      I have reviewed the above medications and allergies     Objective:      Vitals Vitals:    04/14/25 1333   BP: 116/62   BP Location: Right arm   Patient " "Position: Sitting   Cuff Size: Adult   Pulse: 90   Resp: 14   SpO2: 99%   Weight: 65.5 kg (144 lb 6.4 oz)   Height: 152.4 cm (60\")     Body mass index is 28.2 kg/m².    Physical Exam  Vitals reviewed.   Constitutional:       General: She is not in acute distress.     Appearance: She is not ill-appearing.   Pulmonary:      Effort: Pulmonary effort is normal.   Psychiatric:         Mood and Affect: Mood normal.         Behavior: Behavior normal.         Thought Content: Thought content normal.         Judgment: Judgment normal.        Physical Exam  Vital Signs  Blood pressure is normal.     Results  Laboratory Studies  Morning fasting blood sugar is usually between 125 and 150.     Assessment/Plan   Issues Addressed/ Plan   Diagnosis Plan   1. Type 2 diabetes mellitus with hyperglycemia, with long-term current use of insulin  Hemoglobin A1c    Microalbumin / Creatinine Urine Ratio - Urine, Clean Catch      2. Primary hypertension  Comprehensive Metabolic Panel      3. Mixed hyperlipidemia  Lipid Panel      4. Anemia, unspecified type  CBC w AUTO Differential         Assessment & Plan  1. Diabetes Mellitus.  Her morning fasting blood glucose levels are consistently below 150 mg/dL. She is currently on 22 units of insulin glargine, metformin, and Mounjaro. She is advised to continue her current regimen and gradually reduce her insulin dosage as her blood glucose levels remain stable. She is encouraged to maintain a balanced diet and regular exercise routine, including body resistance exercises like Pilates and yoga. Laboratory tests will be conducted today to monitor her condition.    2. Hypertension.  Her blood pressure readings are within the normal range today. She is advised to discontinue hydrochlorothiazide. The potential side effects of orthostatic hypotension were discussed. She is instructed to maintain adequate hydration and monitor her blood pressure regularly. If she experiences symptoms of lightheadedness " or dizziness, she should drink water and consider reducing her lisinopril dosage to 5 mg.    3. Attention deficit hyperactivity disorder (ADHD).  She reports difficulty focusing and needs to keep her mind engaged constantly. She uses various strategies like listening to music and keeping her hands busy to manage her symptoms. No changes in her current management plan were discussed.           There are no Patient Instructions on file for this visit.   Follow up  recommended Return in about 3 months (around 7/14/2025) for Diabetes, Blood Pressure.   - Dragon voice recognition software was utilized to complete this chart.  Every reasonable attempt was made to edit and correct the text, however some incorrect words may remain.   Ramos Narvaez DO    Patient or patient representative verbalized consent for the use of Ambient Listening during the visit with  Ramos Narvaez DO for chart documentation. 4/14/2025  13:58 EDT

## 2025-04-14 NOTE — PROGRESS NOTES
Venipuncture Blood Specimen Collection  Venipuncture performed in left arm by Edilma Santana MA with good hemostasis. Patient tolerated the procedure well without complications.   04/14/25   Edilma Santana MA

## 2025-04-15 LAB
ALBUMIN SERPL-MCNC: 4.5 G/DL (ref 3.8–4.9)
ALBUMIN/CREAT UR: 34 MG/G CREAT (ref 0–29)
ALP SERPL-CCNC: 80 IU/L (ref 44–121)
ALT SERPL-CCNC: 14 IU/L (ref 0–32)
AST SERPL-CCNC: 17 IU/L (ref 0–40)
BASOPHILS # BLD AUTO: 0 X10E3/UL (ref 0–0.2)
BASOPHILS NFR BLD AUTO: 0 %
BILIRUB SERPL-MCNC: <0.2 MG/DL (ref 0–1.2)
BUN SERPL-MCNC: 18 MG/DL (ref 6–24)
BUN/CREAT SERPL: 18 (ref 9–23)
CALCIUM SERPL-MCNC: 9.8 MG/DL (ref 8.7–10.2)
CHLORIDE SERPL-SCNC: 100 MMOL/L (ref 96–106)
CHOLEST SERPL-MCNC: 174 MG/DL (ref 100–199)
CO2 SERPL-SCNC: 21 MMOL/L (ref 20–29)
CREAT SERPL-MCNC: 0.98 MG/DL (ref 0.57–1)
CREAT UR-MCNC: 126.4 MG/DL
EGFRCR SERPLBLD CKD-EPI 2021: 70 ML/MIN/1.73
EOSINOPHIL # BLD AUTO: 0.4 X10E3/UL (ref 0–0.4)
EOSINOPHIL NFR BLD AUTO: 4 %
ERYTHROCYTE [DISTWIDTH] IN BLOOD BY AUTOMATED COUNT: 12.9 % (ref 11.7–15.4)
GLOBULIN SER CALC-MCNC: 2.8 G/DL (ref 1.5–4.5)
GLUCOSE SERPL-MCNC: 104 MG/DL (ref 70–99)
HBA1C MFR BLD: 6.5 % (ref 4.8–5.6)
HCT VFR BLD AUTO: 38.7 % (ref 34–46.6)
HDLC SERPL-MCNC: 32 MG/DL
HGB BLD-MCNC: 12.7 G/DL (ref 11.1–15.9)
IMM GRANULOCYTES # BLD AUTO: 0 X10E3/UL (ref 0–0.1)
IMM GRANULOCYTES NFR BLD AUTO: 0 %
LDLC SERPL CALC-MCNC: 108 MG/DL (ref 0–99)
LYMPHOCYTES # BLD AUTO: 2.2 X10E3/UL (ref 0.7–3.1)
LYMPHOCYTES NFR BLD AUTO: 25 %
MCH RBC QN AUTO: 27.1 PG (ref 26.6–33)
MCHC RBC AUTO-ENTMCNC: 32.8 G/DL (ref 31.5–35.7)
MCV RBC AUTO: 83 FL (ref 79–97)
MICROALBUMIN UR-MCNC: 42.8 UG/ML
MONOCYTES # BLD AUTO: 0.6 X10E3/UL (ref 0.1–0.9)
MONOCYTES NFR BLD AUTO: 6 %
NEUTROPHILS # BLD AUTO: 5.7 X10E3/UL (ref 1.4–7)
NEUTROPHILS NFR BLD AUTO: 65 %
PLATELET # BLD AUTO: 341 X10E3/UL (ref 150–450)
POTASSIUM SERPL-SCNC: 4.3 MMOL/L (ref 3.5–5.2)
PROT SERPL-MCNC: 7.3 G/DL (ref 6–8.5)
RBC # BLD AUTO: 4.69 X10E6/UL (ref 3.77–5.28)
SODIUM SERPL-SCNC: 138 MMOL/L (ref 134–144)
TRIGL SERPL-MCNC: 191 MG/DL (ref 0–149)
VLDLC SERPL CALC-MCNC: 34 MG/DL (ref 5–40)
WBC # BLD AUTO: 8.9 X10E3/UL (ref 3.4–10.8)

## 2025-05-01 DIAGNOSIS — E11.65 TYPE 2 DIABETES MELLITUS WITH HYPERGLYCEMIA, WITH LONG-TERM CURRENT USE OF INSULIN: ICD-10-CM

## 2025-05-01 DIAGNOSIS — Z79.4 TYPE 2 DIABETES MELLITUS WITH HYPERGLYCEMIA, WITH LONG-TERM CURRENT USE OF INSULIN: ICD-10-CM

## 2025-05-01 RX ORDER — PROCHLORPERAZINE 25 MG/1
1 SUPPOSITORY RECTAL
Qty: 3 EACH | Refills: 1 | Status: SHIPPED | OUTPATIENT
Start: 2025-05-01

## 2025-05-01 RX ORDER — PROCHLORPERAZINE 25 MG/1
SUPPOSITORY RECTAL
Qty: 3 EACH | Refills: 10 | Status: SHIPPED | OUTPATIENT
Start: 2025-05-01

## 2025-05-04 DIAGNOSIS — K21.9 GASTROESOPHAGEAL REFLUX DISEASE WITHOUT ESOPHAGITIS: ICD-10-CM

## 2025-05-05 DIAGNOSIS — K21.9 GASTROESOPHAGEAL REFLUX DISEASE WITHOUT ESOPHAGITIS: ICD-10-CM

## 2025-05-05 RX ORDER — CELECOXIB 200 MG/1
200 CAPSULE ORAL DAILY
Qty: 30 CAPSULE | Refills: 2 | Status: SHIPPED | OUTPATIENT
Start: 2025-05-05

## 2025-05-05 RX ORDER — PANTOPRAZOLE SODIUM 40 MG/1
40 TABLET, DELAYED RELEASE ORAL DAILY
Qty: 30 TABLET | Refills: 1 | Status: SHIPPED | OUTPATIENT
Start: 2025-05-05

## 2025-05-05 RX ORDER — PANTOPRAZOLE SODIUM 40 MG/1
40 TABLET, DELAYED RELEASE ORAL DAILY
Qty: 30 TABLET | Refills: 2 | OUTPATIENT
Start: 2025-05-05

## 2025-06-04 DIAGNOSIS — J32.4 CHRONIC PANSINUSITIS: ICD-10-CM

## 2025-06-05 DIAGNOSIS — J32.4 CHRONIC PANSINUSITIS: ICD-10-CM

## 2025-06-05 RX ORDER — PSEUDOEPHEDRINE HYDROCHLORIDE 120 MG/1
120 TABLET, FILM COATED, EXTENDED RELEASE ORAL EVERY 12 HOURS
Qty: 30 TABLET | Refills: 2 | OUTPATIENT
Start: 2025-06-05

## 2025-06-05 RX ORDER — PSEUDOEPHEDRINE HCL 120 MG/1
120 TABLET, FILM COATED, EXTENDED RELEASE ORAL EVERY 12 HOURS
Qty: 30 TABLET | Refills: 1 | Status: SHIPPED | OUTPATIENT
Start: 2025-06-05

## 2025-06-06 ENCOUNTER — HOSPITAL ENCOUNTER (OUTPATIENT)
Dept: MAMMOGRAPHY | Facility: HOSPITAL | Age: 50
Discharge: HOME | End: 2025-06-06
Admitting: STUDENT IN AN ORGANIZED HEALTH CARE EDUCATION/TRAINING PROGRAM
Payer: COMMERCIAL

## 2025-06-06 PROCEDURE — 77067 SCR MAMMO BI INCL CAD: CPT

## 2025-06-06 PROCEDURE — 77063 BREAST TOMOSYNTHESIS BI: CPT

## 2025-06-11 DIAGNOSIS — E11.65 TYPE 2 DIABETES MELLITUS WITH HYPERGLYCEMIA, WITH LONG-TERM CURRENT USE OF INSULIN: ICD-10-CM

## 2025-06-11 DIAGNOSIS — Z79.4 TYPE 2 DIABETES MELLITUS WITH HYPERGLYCEMIA, WITH LONG-TERM CURRENT USE OF INSULIN: ICD-10-CM

## 2025-06-12 NOTE — TELEPHONE ENCOUNTER
Rx Refill Note  Requested Prescriptions     Pending Prescriptions Disp Refills    Tirzepatide 10 MG/0.5ML solution auto-injector 2 mL 2     Sig: Inject 10 mg under the skin into the appropriate area as directed 1 (One) Time Per Week.      Last office visit with prescribing clinician: 4/14/2025   Last telemedicine visit with prescribing clinician: Visit date not found   Next office visit with prescribing clinician: 7/18/2025

## 2025-07-03 ENCOUNTER — RESULTS FOLLOW-UP (OUTPATIENT)
Dept: FAMILY MEDICINE CLINIC | Facility: CLINIC | Age: 50
End: 2025-07-03
Payer: COMMERCIAL

## 2025-07-03 DIAGNOSIS — R92.8 ABNORMALITY OF LEFT BREAST ON SCREENING MAMMOGRAM: Primary | ICD-10-CM

## 2025-07-05 DIAGNOSIS — K21.9 GASTROESOPHAGEAL REFLUX DISEASE WITHOUT ESOPHAGITIS: ICD-10-CM

## 2025-07-07 RX ORDER — PANTOPRAZOLE SODIUM 40 MG/1
40 TABLET, DELAYED RELEASE ORAL DAILY
Qty: 30 TABLET | Refills: 2 | Status: SHIPPED | OUTPATIENT
Start: 2025-07-07

## 2025-07-18 ENCOUNTER — OFFICE VISIT (OUTPATIENT)
Dept: FAMILY MEDICINE CLINIC | Facility: CLINIC | Age: 50
End: 2025-07-18
Payer: COMMERCIAL

## 2025-07-18 VITALS
BODY MASS INDEX: 27.48 KG/M2 | SYSTOLIC BLOOD PRESSURE: 102 MMHG | DIASTOLIC BLOOD PRESSURE: 56 MMHG | HEIGHT: 60 IN | WEIGHT: 140 LBS | RESPIRATION RATE: 18 BRPM

## 2025-07-18 DIAGNOSIS — E78.2 MIXED HYPERLIPIDEMIA: ICD-10-CM

## 2025-07-18 DIAGNOSIS — E11.65 TYPE 2 DIABETES MELLITUS WITH HYPERGLYCEMIA, WITH LONG-TERM CURRENT USE OF INSULIN: Primary | ICD-10-CM

## 2025-07-18 DIAGNOSIS — I10 PRIMARY HYPERTENSION: ICD-10-CM

## 2025-07-18 DIAGNOSIS — Z79.4 TYPE 2 DIABETES MELLITUS WITH HYPERGLYCEMIA, WITH LONG-TERM CURRENT USE OF INSULIN: Primary | ICD-10-CM

## 2025-07-18 RX ORDER — LISINOPRIL 5 MG/1
5 TABLET ORAL DAILY
Qty: 30 TABLET | Refills: 2 | Status: SHIPPED | OUTPATIENT
Start: 2025-07-18

## 2025-07-18 NOTE — PROGRESS NOTES
Venipuncture Blood Specimen Collection  Venipuncture performed in right arm by Edilma Santana MA with good hemostasis. Patient tolerated the procedure well without complications.   07/18/25   Edilma Santana MA

## 2025-07-18 NOTE — PROGRESS NOTES
"    Ramos Narvaez DO  [facility]  [address]  [place] [id number] [phone]    Subjective      Name Jenni Lopez MRN [id number]    1975 AGE/SEX 49 y.o. / female      Chief Complaint Chief Complaint   Patient presents with    Diabetes     3 month check up          Visit History for  2025    Jenni Lopez is a 49 y.o. female who presented today for Diabetes (3 month check up )       History of Present Illness  She has been experiencing weight loss, with her current weight at 140 pounds. She reports feeling well overall, with improved sleep quality. Her daily routine includes working 12 to 13 hours, which she manages without difficulty. She has not required insulin for the past 2 months. Her blood sugar levels typically range from 120 to 150 in the morning, depending on her late-night eating habits. Occasionally, her levels may spike to 200 after consuming pasta but quickly return to normal. She has noticed that stress can cause a slight increase in her A1c levels. She is currently taking metformin and Mounjaro for her diabetes management.    She has been monitoring her weight closely, which fluctuates around 147 pounds but never exceeds this number. She has been incorporating more physical activity into her routine, including walking laps in the gym and lifting weights between 5 to 10 pounds. She does not experience any episodes of lightheadedness.    She underwent a colonoscopy 6 months ago, which showed no abnormalities. She is scheduled for an ultrasound and mammogram on 2025 due to dense breast tissue. Her last eye exam was conducted 2 years ago, and she plans to schedule another one soon.       Medications and Allergies   Current Outpatient Medications   Medication Instructions    albuterol sulfate  (90 Base) MCG/ACT inhaler 2 puffs, Every 4 Hours PRN    BD Disp Needles 20G X 1-1/2\" misc See Admin Instructions    BD ULTRA-FINE PEN " "NEEDLES 29G X 12.7MM misc 1 each, Not Applicable, 4 Times Daily After Meals & at Bedtime    celecoxib (CELEBREX) 200 mg, Oral, Daily    Cinnamon 1,000 mg, Daily    Continuous Blood Gluc  (DEXCOM G6 ) device 1 EACH MC ONE    Continuous Glucose Sensor (Dexcom G6 Sensor) Not Applicable, Every 10 Days    Continuous Glucose Transmitter (Dexcom G6 Transmitter) misc 1 each, Not Applicable, Every 3 Months    famotidine (PEPCID) 20 mg, Oral, 2 Times Daily    lisinopril (PRINIVIL,ZESTRIL) 5 mg, Oral, Daily    meclizine (ANTIVERT) 25 mg, Oral, As Needed, PRN    metFORMIN (GLUCOPHAGE) 1,000 mg, Oral, 2 Times Daily With Meals    multivitamin with minerals tablet tablet 1 tablet, Daily    pantoprazole (PROTONIX) 40 mg, Oral, Daily    pseudoephedrine (SUDAFED) 120 mg, Oral, Every 12 Hours    Tirzepatide 10 mg, Subcutaneous, Weekly     Allergies   Allergen Reactions    Erythromycin Hives, Swelling and Unknown - High Severity      I have reviewed the above medications and allergies     Objective:      Vitals Vitals:    07/18/25 0803   BP: 102/56   BP Location: Left arm   Patient Position: Sitting   Cuff Size: Adult   Resp: 18   Weight: 63.5 kg (140 lb)   Height: 152.4 cm (60\")     Body mass index is 27.34 kg/m².    Physical Exam     Physical Exam       Results  Labs   - Blood glucose test: 110 mg/dL     Assessment/Plan   Issues Addressed/ Plan   Diagnosis Plan   1. Type 2 diabetes mellitus with hyperglycemia, with long-term current use of insulin  Hemoglobin A1c    Comprehensive Metabolic Panel      2. Primary hypertension  Comprehensive Metabolic Panel    lisinopril (PRINIVIL,ZESTRIL) 5 MG tablet      3. Mixed hyperlipidemia  Lipid Panel         Assessment & Plan  1. Diabetes mellitus.  - Glycemic control has been excellent, with blood sugar levels typically between 120-150 mg/dL in the mornings and occasional spikes to 200 mg/dL after high-carb meals, which quickly normalize.  - Has not taken insulin for the past 2 " months and reports feeling great with better sleep and increased physical activity.  - Currently on metformin and Mounjaro. The potential for further weight loss was discussed due to the discontinuation of insulin. The protective benefits of Mounjaro for her kidneys were also explained.  - An A1c test will be conducted today to monitor her diabetes management. If her A1c remains between 6.5-7, no changes to her medication regimen will be made.    2. Blood pressure management.  - Blood pressure has been well-controlled.  - The dosage of lisinopril will be reduced by half, and a 30-day refill will be provided.  - The kidney-protective benefits of lisinopril were discussed, especially given her family history of dialysis.  - Monitoring blood pressure closely with potential for further adjustments based on future readings.    3. Health maintenance.  - Completed a home-based colonoscopy test 6 months ago, which returned normal results.  - Scheduled for a follow-up mammogram and ultrasound on 08/20/2025 due to dense breast tissue.  - Advised to ensure that the results of these tests are sent to the clinic.    4. Vision care.  - Last eye exam was 2 years ago.   - Advised to schedule a new eye exam due to peeling glasses and the need for updated vision assessment.  - Plans to make an appointment with a preferred eye care provider.           There are no Patient Instructions on file for this visit.   Follow up  recommended No follow-ups on file.   - Dragon voice recognition software was utilized to complete this chart.  Every reasonable attempt was made to edit and correct the text, however some incorrect words may remain.   Ramos Narvaez DO    Patient or patient representative verbalized consent for the use of Ambient Listening during the visit with  Ramos Narvaez DO for chart documentation. 7/30/2025  23:24 EDT

## 2025-07-19 LAB
ALBUMIN SERPL-MCNC: 4.4 G/DL (ref 3.5–5.2)
ALBUMIN/GLOB SERPL: 1.6 G/DL
ALP SERPL-CCNC: 71 U/L (ref 39–117)
ALT SERPL-CCNC: 22 U/L (ref 1–33)
AST SERPL-CCNC: 23 U/L (ref 1–32)
BILIRUB SERPL-MCNC: <0.2 MG/DL (ref 0–1.2)
BUN SERPL-MCNC: 29 MG/DL (ref 6–20)
BUN/CREAT SERPL: 26.9 (ref 7–25)
CALCIUM SERPL-MCNC: 9.5 MG/DL (ref 8.6–10.5)
CHLORIDE SERPL-SCNC: 104 MMOL/L (ref 98–107)
CHOLEST SERPL-MCNC: 157 MG/DL (ref 0–200)
CO2 SERPL-SCNC: 21.5 MMOL/L (ref 22–29)
CREAT SERPL-MCNC: 1.08 MG/DL (ref 0.57–1)
EGFRCR SERPLBLD CKD-EPI 2021: 62.3 ML/MIN/1.73
GLOBULIN SER CALC-MCNC: 2.7 GM/DL
GLUCOSE SERPL-MCNC: 129 MG/DL (ref 65–99)
HBA1C MFR BLD: 6.5 % (ref 4.8–5.6)
HDLC SERPL-MCNC: 31 MG/DL (ref 40–60)
LDLC SERPL CALC-MCNC: 98 MG/DL (ref 0–100)
POTASSIUM SERPL-SCNC: 4.5 MMOL/L (ref 3.5–5.2)
PROT SERPL-MCNC: 7.1 G/DL (ref 6–8.5)
SODIUM SERPL-SCNC: 139 MMOL/L (ref 136–145)
TRIGL SERPL-MCNC: 158 MG/DL (ref 0–150)
VLDLC SERPL CALC-MCNC: 28 MG/DL (ref 5–40)

## 2025-08-03 DIAGNOSIS — J32.4 CHRONIC PANSINUSITIS: ICD-10-CM

## 2025-08-04 RX ORDER — PSEUDOEPHEDRINE HCL 120 MG/1
120 TABLET, FILM COATED, EXTENDED RELEASE ORAL EVERY 12 HOURS
Qty: 30 TABLET | Refills: 1 | Status: SHIPPED | OUTPATIENT
Start: 2025-08-04

## 2025-08-04 RX ORDER — CELECOXIB 200 MG/1
200 CAPSULE ORAL DAILY
Qty: 30 CAPSULE | Refills: 2 | Status: SHIPPED | OUTPATIENT
Start: 2025-08-04

## 2025-08-20 ENCOUNTER — HOSPITAL ENCOUNTER (OUTPATIENT)
Dept: MAMMOGRAPHY | Facility: HOSPITAL | Age: 50
Discharge: HOME | End: 2025-08-20
Admitting: STUDENT IN AN ORGANIZED HEALTH CARE EDUCATION/TRAINING PROGRAM
Payer: COMMERCIAL

## 2025-08-20 ENCOUNTER — HOSPITAL ENCOUNTER (OUTPATIENT)
Dept: ULTRASOUND IMAGING | Facility: HOSPITAL | Age: 50
End: 2025-08-20
Payer: COMMERCIAL

## 2025-08-20 PROCEDURE — 77065 DX MAMMO INCL CAD UNI: CPT
